# Patient Record
Sex: FEMALE | Race: WHITE | NOT HISPANIC OR LATINO | Employment: FULL TIME | URBAN - METROPOLITAN AREA
[De-identification: names, ages, dates, MRNs, and addresses within clinical notes are randomized per-mention and may not be internally consistent; named-entity substitution may affect disease eponyms.]

---

## 2017-01-12 ENCOUNTER — OFFICE VISIT (OUTPATIENT)
Dept: GASTROENTEROLOGY | Facility: CLINIC | Age: 27
End: 2017-01-12

## 2017-01-12 ENCOUNTER — APPOINTMENT (OUTPATIENT)
Dept: LAB | Facility: HOSPITAL | Age: 27
End: 2017-01-12
Attending: INTERNAL MEDICINE

## 2017-01-12 VITALS
DIASTOLIC BLOOD PRESSURE: 66 MMHG | SYSTOLIC BLOOD PRESSURE: 118 MMHG | BODY MASS INDEX: 24.73 KG/M2 | WEIGHT: 139.6 LBS | HEIGHT: 63 IN

## 2017-01-12 DIAGNOSIS — R12 HEARTBURN: ICD-10-CM

## 2017-01-12 DIAGNOSIS — R10.84 GENERALIZED ABDOMINAL PAIN: ICD-10-CM

## 2017-01-12 DIAGNOSIS — R19.7 DIARRHEA, UNSPECIFIED TYPE: ICD-10-CM

## 2017-01-12 DIAGNOSIS — K59.00 CONSTIPATION, UNSPECIFIED CONSTIPATION TYPE: Primary | ICD-10-CM

## 2017-01-12 PROCEDURE — 83516 IMMUNOASSAY NONANTIBODY: CPT | Performed by: INTERNAL MEDICINE

## 2017-01-12 PROCEDURE — 99203 OFFICE O/P NEW LOW 30 MIN: CPT | Performed by: INTERNAL MEDICINE

## 2017-01-12 PROCEDURE — 36415 COLL VENOUS BLD VENIPUNCTURE: CPT | Performed by: INTERNAL MEDICINE

## 2017-01-12 RX ORDER — OMEPRAZOLE 20 MG/1
20 CAPSULE, DELAYED RELEASE ORAL DAILY
Qty: 30 CAPSULE | Refills: 5 | Status: SHIPPED | OUTPATIENT
Start: 2017-01-12 | End: 2017-07-01 | Stop reason: SDUPTHER

## 2017-01-12 NOTE — PROGRESS NOTES
PATIENT INFORMATION  Bernardo Villafana       - 1990    CHIEF COMPLAINT  Chief Complaint   Patient presents with   • Abdominal Pain   • Constipation   • Diarrhea   • Heartburn       HISTORY OF PRESENT ILLNESS  Abdominal Pain   Associated symptoms include constipation, diarrhea and headaches. Her past medical history is significant for GERD.   Constipation   Associated symptoms include abdominal pain and diarrhea.   Diarrhea    Associated symptoms include abdominal pain and headaches.   Heartburn   She complains of abdominal pain.     25 yo here with long history of intermittent crampy abdominal pain with associated bloating and gas ongoing for years.  She was told she had IBS and to increase fiber intake. She was doing better up until about 2 months ago when she developed intermittent retrosternal chest pains,occured daily. Pains usually does not occur with eating. They last sometimes hours. TUMS did not help. No radiation of pain. Intensity was mild. Worse with stress. No dysphagia or odynophagia. She is under a lot of stress in the past several months with selling a house.  She tried Prilosec otc for 2 weeks and did not have any chest pains at this time.       REVIEW OF SYSTEMS  Review of Systems   HENT: Positive for congestion, dental problem, rhinorrhea and sinus pressure.    Gastrointestinal: Positive for abdominal pain, constipation and diarrhea.        REFLUX   Allergic/Immunologic: Positive for environmental allergies.   Neurological: Positive for headaches.   Psychiatric/Behavioral: Positive for sleep disturbance. The patient is nervous/anxious.         ANXIETY/DEPRESSION   All other systems reviewed and are negative.        ACTIVE PROBLEMS  There are no active problems to display for this patient.        PAST MEDICAL HISTORY  Past Medical History   Diagnosis Date   • GERD (gastroesophageal reflux disease)          SURGICAL HISTORY  Past Surgical History   Procedure Laterality Date   • [place]  "tooth extraction           FAMILY HISTORY  Family History   Problem Relation Age of Onset   • No Known Problems Mother    • No Known Problems Father          SOCIAL HISTORY  Social History     Occupational History   • Not on file.     Social History Main Topics   • Smoking status: Never Smoker   • Smokeless tobacco: Never Used   • Alcohol use Yes   • Drug use: Not on file   • Sexual activity: Not on file         CURRENT MEDICATIONS    Current Outpatient Prescriptions:   •  ALPRAZolam (XANAX) 0.5 MG tablet, Take 0.5 mg by mouth 2 (Two) Times a Day As Needed for anxiety., Disp: , Rfl:   •  FLUoxetine (PROzac) 20 MG capsule, Take 20 mg by mouth Daily., Disp: , Rfl:   •  omeprazole (priLOSEC) 20 MG capsule, Take 1 capsule by mouth Daily., Disp: 30 capsule, Rfl: 5    ALLERGIES  Sulfa antibiotics    VITALS  Vitals:    01/12/17 1110   BP: 118/66   Weight: 139 lb 9.6 oz (63.3 kg)   Height: 63\" (160 cm)       LAST RESULTS   No results found for any previous visit.  No results found.    PHYSICAL EXAM  Physical Exam   Constitutional: She is oriented to person, place, and time. She appears well-developed and well-nourished. No distress.   HENT:   Head: Normocephalic and atraumatic.   Mouth/Throat: Oropharynx is clear and moist.   Eyes: EOM are normal. Pupils are equal, round, and reactive to light.   Neck: Normal range of motion. No tracheal deviation present.   Cardiovascular: Normal rate, regular rhythm, normal heart sounds and intact distal pulses.  Exam reveals no gallop and no friction rub.    No murmur heard.  Pulmonary/Chest: Effort normal and breath sounds normal. No stridor. No respiratory distress. She has no wheezes. She has no rales. She exhibits no tenderness.   Abdominal: Soft. Bowel sounds are normal. She exhibits no distension. There is no tenderness. There is no rebound and no guarding.   Musculoskeletal: She exhibits no edema.   Lymphadenopathy:     She has no cervical adenopathy.   Neurological: She is alert " and oriented to person, place, and time.   Skin: Skin is warm. She is not diaphoretic.   Psychiatric: She has a normal mood and affect. Her behavior is normal. Judgment and thought content normal.   Nursing note and vitals reviewed.      ASSESSMENT  Diagnoses and all orders for this visit:    Constipation, unspecified constipation type    Diarrhea, unspecified type    Heartburn  -     Celiac Ab tTG DGP TIgA    Generalized abdominal pain  -     Celiac Ab tTG DGP TIgA    Other orders  -     omeprazole (priLOSEC) 20 MG capsule; Take 1 capsule by mouth Daily.          PLAN  No Follow-up on file.    Align once daily    Antireflux measures and dietary modifications reviewed. Low acid diet reviewed. Keep head of bed elevated. Stop eating/drinking at least 3 hours prior to bedtime. Eliminate caffeine and carbonated beverages.  Weight loss encouraged if BMI over 25.

## 2017-01-12 NOTE — LETTER
January 12, 2017     Raisa Buchanan MD  [address]  [facility] [address]    Patient: Bernardo Villafana   YOB: 1990   Date of Visit: 1/12/2017       Dear Dr. Dewayne MD:    Thank you for referring Bernardo Villafana to me for evaluation. Below are the relevant portions of my assessment and plan of care.                   If you have questions, please do not hesitate to call me. I look forward to following Bernardo along with you.         Sincerely,        Elizabet Martines MD        CC: No Recipients

## 2017-01-12 NOTE — MR AVS SNAPSHOT
Bernardo Villafana   1/12/2017 11:15 AM   Office Visit    Dept Phone:  [phone]   Encounter #:  [id number]    Provider:  Elizabet Martines MD   Department:  [facility]                Your Full Care Plan              Where to Get Your Medications      These medications were sent to [facility]/pharmacy #[id number] - [place], [place] - [address] [address] AT INTERSECTION OF [address] [phone] [facility] [phone]   [address] [address], [facility] [address]     Phone:  [phone]     omeprazole 20 MG capsule            Your Updated Medication List          This list is accurate as of: 1/12/17 12:01 PM.  Always use your most recent med list.                ALPRAZolam 0.5 MG tablet   Commonly known as:  XANAX       FLUoxetine 20 MG capsule   Commonly known as:  PROzac       omeprazole 20 MG capsule   Commonly known as:  priLOSEC   Take 1 capsule by mouth Daily.               We Performed the Following     Celiac Ab tTG DGP TIgA       You Were Diagnosed With        Codes Comments    Constipation, unspecified constipation type    -  Primary ICD-10-CM: K59.00  ICD-9-CM: 564.00     Diarrhea, unspecified type     ICD-10-CM: R19.7  ICD-9-CM: 787.91     Heartburn     ICD-10-CM: R12  ICD-9-CM: 787.1     Generalized abdominal pain     ICD-10-CM: R10.84  ICD-9-CM: 789.07       Instructions     None    Patient Instructions History      Upcoming Appointments     Visit Type Date Time Department    NEW PATIENT 1/12/2017 11:15 AM [facility]    OFFICE VISIT 3/22/2017  3:30 PM [facility]      MyChart Signup     Our records indicate that you have declined [facility] MyChart signup. If you would like to sign up for MyChart, please email [email or web address] or call [phone] to obtain an activation code.             Other Info from Your Visit           Your Appointments     Mar 22, 2017  3:30 PM EDT   Office Visit with Elizabet Martines MD   [facility]  "[facility] (--)    [address]  [place] [address] [phone]           Arrive 15 minutes prior to appointment.              Allergies     Sulfa Antibiotics  Nausea And Vomiting      Reason for Visit     Abdominal Pain     Constipation     Diarrhea     Heartburn           Vital Signs     Blood Pressure Height Weight Body Mass Index Smoking Status       118/66 63\" (160 cm) 139 lb 9.6 oz (63.3 kg) 24.73 kg/m2 Never Smoker       Problems and Diagnoses Noted     Constipation    -  Primary    Diarrhea        Heartburn        Generalized abdominal pain            "

## 2017-01-13 LAB
GLIADIN PEPTIDE IGA SER-ACNC: 4 UNITS (ref 0–19)
GLIADIN PEPTIDE IGG SER-ACNC: 16 UNITS (ref 0–19)
IGA SERPL-MCNC: 213 MG/DL (ref 87–352)
TTG IGA SER-ACNC: <2 U/ML (ref 0–3)
TTG IGG SER-ACNC: <2 U/ML (ref 0–5)

## 2017-03-22 ENCOUNTER — OFFICE VISIT (OUTPATIENT)
Dept: GASTROENTEROLOGY | Facility: CLINIC | Age: 27
End: 2017-03-22

## 2017-03-22 VITALS
BODY MASS INDEX: 24.77 KG/M2 | SYSTOLIC BLOOD PRESSURE: 120 MMHG | DIASTOLIC BLOOD PRESSURE: 67 MMHG | WEIGHT: 139.8 LBS | HEIGHT: 63 IN

## 2017-03-22 DIAGNOSIS — R10.30 LOWER ABDOMINAL PAIN: Primary | ICD-10-CM

## 2017-03-22 DIAGNOSIS — K21.9 GASTROESOPHAGEAL REFLUX DISEASE, ESOPHAGITIS PRESENCE NOT SPECIFIED: ICD-10-CM

## 2017-03-22 PROCEDURE — 99214 OFFICE O/P EST MOD 30 MIN: CPT | Performed by: INTERNAL MEDICINE

## 2017-03-22 RX ORDER — HYOSCYAMINE SULFATE 0.125 MG
0.12 TABLET ORAL
Qty: 120 TABLET | Refills: 5 | Status: SHIPPED | OUTPATIENT
Start: 2017-03-22 | End: 2022-01-17

## 2017-03-22 NOTE — PROGRESS NOTES
PATIENT INFORMATION  Bernardo Villafana       - 1990    CHIEF COMPLAINT  Chief Complaint   Patient presents with   • Abdominal Pain     10 WEEK FOLLOW UP       HISTORY OF PRESENT ILLNESS  Abdominal Pain   Associated symptoms include headaches.     She is here today in follow up and chest pains have been better since continuing on Prilosec. No dysphagia or odynophagia. Labs are reviewed and celiac was negative.  Her chronic abdominal pain has been ongoing for years, intermittent, lower abdomen.  Symptoms are worse with dairy. She has had some modifications in her diet without any help. Pain occurs every other day. No known alleviating factors. Weight has been stable and no blood in the stool. No family history of IBD or colon cancer.  Pain severity is mild.   No carbonated beverages. Occ coffee in the am. She does drink fruit juices daily.      REVIEW OF SYSTEMS  Review of Systems   HENT: Positive for sinus pressure.    Gastrointestinal: Positive for abdominal pain.   Neurological: Positive for headaches.   All other systems reviewed and are negative.        ACTIVE PROBLEMS  There are no active problems to display for this patient.        PAST MEDICAL HISTORY  Past Medical History:   Diagnosis Date   • GERD (gastroesophageal reflux disease)          SURGICAL HISTORY  Past Surgical History:   Procedure Laterality Date   • WISDOM TOOTH EXTRACTION           FAMILY HISTORY  Family History   Problem Relation Age of Onset   • No Known Problems Mother    • No Known Problems Father          SOCIAL HISTORY  Social History     Occupational History   • Not on file.     Social History Main Topics   • Smoking status: Never Smoker   • Smokeless tobacco: Never Used   • Alcohol use Yes   • Drug use: Not on file   • Sexual activity: Not on file         CURRENT MEDICATIONS    Current Outpatient Prescriptions:   •  ALPRAZolam (XANAX) 0.5 MG tablet, Take 0.5 mg by mouth 2 (Two) Times a Day As Needed for anxiety., Disp: , Rfl:  "  •  FLUoxetine (PROzac) 20 MG capsule, Take 20 mg by mouth Daily., Disp: , Rfl:   •  hyoscyamine (ANASPAZ,LEVSIN) 0.125 MG tablet, Take 1 tablet by mouth 2 (Two) Times a Day Before Meals., Disp: 120 tablet, Rfl: 5  •  omeprazole (priLOSEC) 20 MG capsule, Take 1 capsule by mouth Daily., Disp: 30 capsule, Rfl: 5    ALLERGIES  Sulfa antibiotics    VITALS  Vitals:    03/22/17 1516   BP: 120/67   Weight: 139 lb 12.8 oz (63.4 kg)   Height: 63\" (160 cm)       LAST RESULTS   Office Visit on 01/12/2017   Component Date Value Ref Range Status   • IgA 01/12/2017 213  87 - 352 mg/dL Final   • Gliadin Deamidated Peptide Ab, IgA 01/12/2017 4  0 - 19 units Final                       Negative                   0 - 19                     Weak Positive             20 - 30                     Moderate to Strong Positive   >30   • Deaminated Gliadin Ab IgG 01/12/2017 16  0 - 19 units Final                       Negative                   0 - 19                     Weak Positive             20 - 30                     Moderate to Strong Positive   >30   • Tissue Transglutaminase IgA 01/12/2017 <2  0 - 3 U/mL Final                                  Negative        0 -  3                                Weak Positive   4 - 10                                Positive           >10   Tissue Transglutaminase (tTG) has been identified   as the endomysial antigen.  Studies have demonstr-   ated that endomysial IgA antibodies have over 99%   specificity for gluten sensitive enteropathy.   • Tissue Transglutaminase IgG 01/12/2017 <2  0 - 5 U/mL Final                                  Negative        0 - 5                                Weak Positive   6 - 9                                Positive           >9     No results found.    PHYSICAL EXAM  Physical Exam   Constitutional: She is oriented to person, place, and time. She appears well-developed and well-nourished. No distress.   HENT:   Head: Normocephalic and atraumatic.   Mouth/Throat: " Oropharynx is clear and moist.   Eyes: EOM are normal. Pupils are equal, round, and reactive to light.   Neck: Normal range of motion. No tracheal deviation present.   Cardiovascular: Normal rate, regular rhythm, normal heart sounds and intact distal pulses.  Exam reveals no gallop and no friction rub.    No murmur heard.  Pulmonary/Chest: Effort normal and breath sounds normal. No stridor. No respiratory distress. She has no wheezes. She has no rales. She exhibits no tenderness.   Abdominal: Soft. Bowel sounds are normal. She exhibits no distension. There is no tenderness. There is no rebound and no guarding.   Musculoskeletal: She exhibits no edema.   Lymphadenopathy:     She has no cervical adenopathy.   Neurological: She is alert and oriented to person, place, and time.   Skin: Skin is warm. She is not diaphoretic.   Psychiatric: She has a normal mood and affect. Her behavior is normal. Judgment and thought content normal.   Nursing note and vitals reviewed.      ASSESSMENT  Diagnoses and all orders for this visit:    Lower abdominal pain    Gastroesophageal reflux disease, esophagitis presence not specified    Other orders  -     hyoscyamine (ANASPAZ,LEVSIN) 0.125 MG tablet; Take 1 tablet by mouth 2 (Two) Times a Day Before Meals.          PLAN  No Follow-up on file.    Diet diary    Antireflux measures and dietary modifications reviewed. Low acid diet reviewed. Keep head of bed elevated. Stop eating/drinking at least 3 hours prior to bedtime. Eliminate caffeine and carbonated beverages.  Weight loss encouraged if BMI over 25.

## 2017-05-09 ENCOUNTER — OFFICE VISIT (OUTPATIENT)
Dept: GASTROENTEROLOGY | Facility: CLINIC | Age: 27
End: 2017-05-09

## 2017-05-09 VITALS
SYSTOLIC BLOOD PRESSURE: 118 MMHG | HEIGHT: 63 IN | OXYGEN SATURATION: 97 % | BODY MASS INDEX: 24.27 KG/M2 | HEART RATE: 81 BPM | WEIGHT: 137 LBS | TEMPERATURE: 98.3 F | DIASTOLIC BLOOD PRESSURE: 68 MMHG

## 2017-05-09 DIAGNOSIS — R12 HEARTBURN: ICD-10-CM

## 2017-05-09 DIAGNOSIS — K58.9 IRRITABLE BOWEL SYNDROME, UNSPECIFIED TYPE: ICD-10-CM

## 2017-05-09 DIAGNOSIS — R10.30 LOWER ABDOMINAL PAIN: Primary | ICD-10-CM

## 2017-05-09 DIAGNOSIS — R19.7 DIARRHEA, UNSPECIFIED TYPE: ICD-10-CM

## 2017-05-09 DIAGNOSIS — K59.00 CONSTIPATION, UNSPECIFIED CONSTIPATION TYPE: ICD-10-CM

## 2017-05-09 PROCEDURE — 99213 OFFICE O/P EST LOW 20 MIN: CPT | Performed by: INTERNAL MEDICINE

## 2017-07-03 RX ORDER — OMEPRAZOLE 20 MG/1
CAPSULE, DELAYED RELEASE ORAL
Qty: 30 CAPSULE | Refills: 5 | Status: SHIPPED | OUTPATIENT
Start: 2017-07-03 | End: 2022-01-17

## 2021-10-12 ENCOUNTER — TRANSCRIBE ORDERS (OUTPATIENT)
Dept: ULTRASOUND IMAGING | Facility: HOSPITAL | Age: 31
End: 2021-10-12

## 2021-10-12 DIAGNOSIS — N93.9 VAGINAL BLEEDING: Primary | ICD-10-CM

## 2021-10-13 ENCOUNTER — HOSPITAL ENCOUNTER (OUTPATIENT)
Dept: ULTRASOUND IMAGING | Facility: HOSPITAL | Age: 31
Discharge: HOME | End: 2021-10-13
Admitting: INTERNAL MEDICINE

## 2021-10-13 DIAGNOSIS — N93.9 VAGINAL BLEEDING: ICD-10-CM

## 2021-10-13 PROCEDURE — 76817 TRANSVAGINAL US OBSTETRIC: CPT

## 2021-10-15 ENCOUNTER — HOSPITAL ENCOUNTER (OUTPATIENT)
Dept: ULTRASOUND IMAGING | Facility: HOSPITAL | Age: 31
End: 2021-10-15

## 2021-11-10 ENCOUNTER — OFFICE VISIT (OUTPATIENT)
Dept: OBSTETRICS AND GYNECOLOGY | Facility: CLINIC | Age: 31
End: 2021-11-10

## 2021-11-10 VITALS
HEIGHT: 63 IN | OXYGEN SATURATION: 100 % | BODY MASS INDEX: 28.49 KG/M2 | SYSTOLIC BLOOD PRESSURE: 122 MMHG | WEIGHT: 160.8 LBS | HEART RATE: 105 BPM | DIASTOLIC BLOOD PRESSURE: 78 MMHG

## 2021-11-10 DIAGNOSIS — Z01.419 WELL WOMAN EXAM: ICD-10-CM

## 2021-11-10 DIAGNOSIS — Z87.59 H/O ONE MISCARRIAGE: ICD-10-CM

## 2021-11-10 DIAGNOSIS — Z11.51 SPECIAL SCREENING EXAMINATION FOR HUMAN PAPILLOMAVIRUS (HPV): ICD-10-CM

## 2021-11-10 DIAGNOSIS — Z01.419 ROUTINE GYNECOLOGICAL EXAMINATION: ICD-10-CM

## 2021-11-10 DIAGNOSIS — Z01.419 PAP SMEAR, LOW-RISK: Primary | ICD-10-CM

## 2021-11-10 LAB
B-HCG UR QL: NEGATIVE
BILIRUB BLD-MCNC: NEGATIVE MG/DL
CLARITY, POC: CLEAR
COLOR UR: YELLOW
EXPIRATION DATE: NORMAL
GLUCOSE UR STRIP-MCNC: NEGATIVE MG/DL
INTERNAL NEGATIVE CONTROL: NEGATIVE
INTERNAL POSITIVE CONTROL: POSITIVE
KETONES UR QL: NEGATIVE
LEUKOCYTE EST, POC: NEGATIVE
Lab: NORMAL
NITRITE UR-MCNC: NEGATIVE MG/ML
PH UR: 5 [PH] (ref 5–8)
PROT UR STRIP-MCNC: NEGATIVE MG/DL
RBC # UR STRIP: NEGATIVE /UL
SP GR UR: 1.02 (ref 1–1.03)
UROBILINOGEN UR QL: NORMAL

## 2021-11-10 PROCEDURE — 81002 URINALYSIS NONAUTO W/O SCOPE: CPT | Performed by: OBSTETRICS & GYNECOLOGY

## 2021-11-10 PROCEDURE — 99385 PREV VISIT NEW AGE 18-39: CPT | Performed by: OBSTETRICS & GYNECOLOGY

## 2021-11-10 PROCEDURE — 81025 URINE PREGNANCY TEST: CPT | Performed by: OBSTETRICS & GYNECOLOGY

## 2021-11-10 RX ORDER — DEXTROAMPHETAMINE SACCHARATE, AMPHETAMINE ASPARTATE, DEXTROAMPHETAMINE SULFATE AND AMPHETAMINE SULFATE 5; 5; 5; 5 MG/1; MG/1; MG/1; MG/1
20 TABLET ORAL DAILY
COMMUNITY
End: 2022-01-17

## 2021-11-10 RX ORDER — MULTIPLE VITAMINS W/ MINERALS TAB 9MG-400MCG
1 TAB ORAL DAILY
COMMUNITY
End: 2022-05-27

## 2021-11-10 RX ORDER — CETIRIZINE HYDROCHLORIDE 10 MG/1
10 TABLET ORAL DAILY
COMMUNITY

## 2021-11-10 NOTE — PROGRESS NOTES
OB CONFIRMATION APPOINTMENT    CC- Pt has had a menstrual irregularity    No chief complaint on file.       HISTORY OF PRESENT ILLNESS:    Amenorrhea  This is a new problem. The current episode started more than 1 month ago. The problem occurs constantly. The problem has been unchanged. Associated symptoms include fatigue. Pertinent negatives include no abdominal pain. Nothing aggravates the symptoms. She has tried nothing for the symptoms.       Bernardo Tapia is being seen today to confirm she is pregnant.    She is a 30 y.o. No obstetric history on file. No LMP recorded..  EDC= ***.  Gestational age is ***wks     Current obstetric complaints : ***     Prior obstetric issues, potential pregnancy concerns: ***    Family history of genetic issues (includes FOB): ***    OFFERED GENETIC TESTING: CfDNA, Cystic fibrosis, Spinal muscular atrophy, Fragile X syndrome: ***    Prior infections concerning in pregnancy (Rash, fever in last 2 weeks): ***    HISTORY REVIEWED:      Past Medical History:   Diagnosis Date   • GERD (gastroesophageal reflux disease)        Past Surgical History:   Procedure Laterality Date   • WISDOM TOOTH EXTRACTION           Current Outpatient Medications:   •  ALPRAZolam (XANAX) 0.5 MG tablet, Take 0.5 mg by mouth 2 (Two) Times a Day As Needed for anxiety., Disp: , Rfl:   •  CRYSELLE-28 0.3-30 MG-MCG per tablet, TAKE 1 TABLET BY MOUTH EVERY DAY, Disp: , Rfl: 12  •  DULoxetine (CYMBALTA) 30 MG capsule, , Disp: , Rfl:   •  FLUoxetine (PROzac) 20 MG capsule, Take 20 mg by mouth Daily., Disp: , Rfl:   •  hyoscyamine (ANASPAZ,LEVSIN) 0.125 MG tablet, Take 1 tablet by mouth 2 (Two) Times a Day Before Meals., Disp: 120 tablet, Rfl: 5  •  omeprazole (priLOSEC) 20 MG capsule, TAKE 1 CAPSULE BY MOUTH DAILY., Disp: 30 capsule, Rfl: 5  •  VYVANSE 20 MG capsule, TAKE 1 CAPSULE EVERY MORNING, Disp: , Rfl: 0    Allergies   Allergen Reactions   • Sulfa Antibiotics Nausea And Vomiting       Social History      Socioeconomic History   • Marital status:    Tobacco Use   • Smoking status: Never Smoker   • Smokeless tobacco: Never Used   Substance and Sexual Activity   • Alcohol use: Yes       Family History   Problem Relation Age of Onset   • No Known Problems Mother    • No Known Problems Father        REVIEW OF SYSTEMS:     Review of Systems   Constitutional: Positive for fatigue.   HENT: Negative.    Eyes: Negative.    Respiratory: Negative.    Cardiovascular: Negative.    Gastrointestinal: Negative.  Negative for abdominal pain.   Endocrine: Negative.    Genitourinary: Positive for menstrual problem.   Musculoskeletal: Negative.    Skin: Negative.    Allergic/Immunologic: Negative.    Neurological: Negative.    Hematological: Negative.    Psychiatric/Behavioral: Negative.        Physical Exam     Physical Exam  Vitals and nursing note reviewed.   Constitutional:       Appearance: She is well-developed.   HENT:      Head: Normocephalic and atraumatic.   Cardiovascular:      Rate and Rhythm: Normal rate.   Pulmonary:      Effort: Pulmonary effort is normal.   Abdominal:      General: There is no distension.      Palpations: Abdomen is soft. There is no mass.      Tenderness: There is no abdominal tenderness. There is no guarding.   Genitourinary:     Vagina: No vaginal discharge.   Musculoskeletal:         General: No tenderness or deformity. Normal range of motion.      Cervical back: Normal range of motion.   Skin:     General: Skin is warm and dry.      Coloration: Skin is not pale.      Findings: No erythema or rash.   Neurological:      Mental Status: She is alert and oriented to person, place, and time.   Psychiatric:         Behavior: Behavior normal.         Thought Content: Thought content normal.         Judgment: Judgment normal.             Objective    There were no vitals taken for this visit.    Bernardo Tapia  reports that she has never smoked. She has never used smokeless tobacco.. I have  "educated her on the risk of diseases from using tobacco products such as {Tobacco Cessation Diseases:42204::\"cancer\",\"COPD\",\"heart disease\"}.     I advised her to quit and she is {Willing/Not Willing to Quit Tobacco Products:42659}.    I spent {Time Spent Tobacco :89967} minutes counseling the patient.               Assessment    1) Pregnancy at Unknown   There are no diagnoses linked to this encounter.     Plan    Patient is on Prenatal vitamins  Problem list reviewed and updated.  Reviewed routine prenatal care with the patient  Zika (travel restrictions/ok to use insect repellant), not to changing cat litter, food restrictions, avoidance of alcohol, tobacco and drugs and saunas/hot tubs.   All questions answered.     Counseling was given to {person:[id number]} for the following topics: {topic:63255} . I spent *** minutes caring for Bernardo on this date of service. This time includes time spent by me in the following activities: {TIMEACTIVITIES:58386}      RTO No follow-ups on file.    Alexis Martinez MD    11/9/2021  20:32 EST        "

## 2021-11-10 NOTE — PROGRESS NOTES
GYN Annual Exam     CC- Here for annual exam. Pt has had a miscarriage recently diagnosed by her PCP.     Pt new to practice? Yes  Pt new to me? Yes     Bernardo Tapia is a 30 y.o. No obstetric history on file. female who presents for annual well woman exam. Patient's last menstrual period was 10/08/2021.    Problems in addition to need for annual: wants to make sure everything is ok.     HPI: History of Present Illness    PMHX:  Patient Active Problem List   Diagnosis   • H/O one miscarriage   ; otherwise none    OB History    No obstetric history on file.           Past Medical History:   Diagnosis Date   • GERD (gastroesophageal reflux disease)        Past Surgical History:   Procedure Laterality Date   • WISDOM TOOTH EXTRACTION           Current Outpatient Medications:   •  ALPRAZolam (XANAX) 0.5 MG tablet, Take 0.5 mg by mouth 2 (Two) Times a Day As Needed for anxiety., Disp: , Rfl:   •  amphetamine-dextroamphetamine (ADDERALL) 20 MG tablet, Take 20 mg by mouth Daily., Disp: , Rfl:   •  cetirizine (zyrTEC) 10 MG tablet, Take 10 mg by mouth Daily., Disp: , Rfl:   •  multivitamin with minerals tablet tablet, Take 1 tablet by mouth Daily., Disp: , Rfl:   •  CRYSELLE-28 0.3-30 MG-MCG per tablet, TAKE 1 TABLET BY MOUTH EVERY DAY, Disp: , Rfl: 12  •  DULoxetine (CYMBALTA) 30 MG capsule, , Disp: , Rfl:   •  FLUoxetine (PROzac) 20 MG capsule, Take 20 mg by mouth Daily., Disp: , Rfl:   •  hyoscyamine (ANASPAZ,LEVSIN) 0.125 MG tablet, Take 1 tablet by mouth 2 (Two) Times a Day Before Meals., Disp: 120 tablet, Rfl: 5  •  omeprazole (priLOSEC) 20 MG capsule, TAKE 1 CAPSULE BY MOUTH DAILY., Disp: 30 capsule, Rfl: 5  •  VYVANSE 20 MG capsule, TAKE 1 CAPSULE EVERY MORNING, Disp: , Rfl: 0    Allergies   Allergen Reactions   • Sulfa Antibiotics Nausea And Vomiting and Hives       Social History     Tobacco Use   • Smoking status: Never Smoker   • Smokeless tobacco: Never Used   Substance Use Topics   • Alcohol use: Yes   •  "Drug use: Not on file       Bernardo Tapia  reports that she has never smoked. She has never used smokeless tobacco..            Family History   Problem Relation Age of Onset   • No Known Problems Mother    • No Known Problems Father        Review of Systems    Patient reports that she is not currently experiencing any symptoms of urinary incontinence.      noTESTED FOR CHLAMYDIA?    EXAM:  /78 (BP Location: Left arm, Patient Position: Sitting, Cuff Size: Adult)   Pulse 105   Ht 160 cm (63\")   Wt 72.9 kg (160 lb 12.8 oz)   LMP 10/08/2021   SpO2 100%   BMI 28.48 kg/m²     Labs:   Lab Results (last 24 hours)     Procedure Component Value Units Date/Time    POC Pregnancy, Urine [[id number]]  (Normal) Collected: 11/10/21 0919    Specimen: Urine Updated: 11/10/21 0920     HCG, Urine, QL Negative     Lot Number [id number]     Internal Positive Control Positive     Internal Negative Control Negative     Expiration Date 01/31/23    POC Urinalysis Dipstick [[id number]]  (Normal) Collected: 11/10/21 0919    Specimen: Urine Updated: 11/10/21 0920     Color Yellow     Clarity, UA Clear     Glucose, UA Negative mg/dL      Bilirubin Negative     Ketones, UA Negative     Specific Gravity  1.025     Blood, UA Negative     pH, Urine 5.0     Protein, POC Negative mg/dL      Urobilinogen, UA Normal     Leukocytes Negative     Nitrite, UA Negative          Physical Exam  Vitals and nursing note reviewed. Exam conducted with a chaperone present.   Constitutional:       General: She is not in acute distress.     Appearance: She is well-developed. She is not diaphoretic.   HENT:      Head: Normocephalic and atraumatic.      Nose: Nose normal.   Eyes:      Extraocular Movements: Extraocular movements intact.   Cardiovascular:      Rate and Rhythm: Normal rate.   Pulmonary:      Effort: Pulmonary effort is normal.   Chest:   Breasts: Breasts are symmetrical.      Right: Normal. No mass, nipple discharge, skin change, tenderness " or axillary adenopathy.      Left: Normal. No mass, nipple discharge, skin change, tenderness or axillary adenopathy.       Abdominal:      General: There is no distension.      Palpations: Abdomen is soft. There is no mass.      Tenderness: There is no abdominal tenderness. There is no guarding.   Genitourinary:     General: Normal vulva.      Pubic Area: No rash.       Vagina: Normal. No vaginal discharge.      Cervix: Normal.      Uterus: Normal.       Adnexa: Right adnexa normal and left adnexa normal.   Musculoskeletal:         General: No tenderness or deformity. Normal range of motion.      Cervical back: Normal range of motion.   Lymphadenopathy:      Upper Body:      Right upper body: No axillary adenopathy.      Left upper body: No axillary adenopathy.   Skin:     General: Skin is warm and dry.      Coloration: Skin is not pale.      Findings: No erythema or rash.   Neurological:      Mental Status: She is alert and oriented to person, place, and time.   Psychiatric:         Behavior: Behavior normal.         Thought Content: Thought content normal.         Judgment: Judgment normal.            As part of wellness and prevention, the following topics were discussed with the patient: healthy weight, substance abuse/misuse, mental health, encouraging self breast exam, and other counseling and guidance done:  Nutrition, physical activity, healthy weight, injury prevention, misuse of tobacco, alcohol and drugs, sexual behavior and STDs, contraception, dental health, mental health, immunizations breast cancer screening and exams.    I saw the patient with a face mask, gloves and eye protection  The patient herself was masked.  Social distancing was observed as appropriate. All COVID precautions observed.  Pt encouraged to receive COVID vaccine if she hadn't already done this.     Assessment     1) GYN annual well woman exam.   2) PAP done today? Yes  3) problems addressed: yes        Fhx breast cancer? Yes  mother    Fhx ovarian cancer? No    Fhx colon cancer? No    Inv[employer]ae testing offered? Yes           Plan       Follow up prn or one year.    Diagnoses and all orders for this visit:    1. Pap smear, low-risk (Primary)  -     IgP, Aptima HPV    2. H/O one miscarriage    3. Well woman exam    4. Routine gynecological examination  -     POC Urinalysis Dipstick  -     POC Pregnancy, Urine  -     IgP, Aptima HPV    5. Special screening examination for human papillomavirus (HPV)  -     IgP, Aptima HPV        RTO Return in about 1 year (around 11/10/2022) for Annual physical.          Alexis Martinez MD  [unfilled]  09:25 EST

## 2021-11-15 LAB
CYTOLOGIST CVX/VAG CYTO: NORMAL
CYTOLOGY CVX/VAG DOC CYTO: NORMAL
CYTOLOGY CVX/VAG DOC THIN PREP: NORMAL
DX ICD CODE: NORMAL
HIV 1 & 2 AB SER-IMP: NORMAL
HPV I/H RISK 4 DNA CVX QL PROBE+SIG AMP: NEGATIVE
OTHER STN SPEC: NORMAL
STAT OF ADQ CVX/VAG CYTO-IMP: NORMAL

## 2022-01-17 ENCOUNTER — OFFICE VISIT (OUTPATIENT)
Dept: OBSTETRICS AND GYNECOLOGY | Facility: CLINIC | Age: 32
End: 2022-01-17

## 2022-01-17 VITALS
BODY MASS INDEX: 28.53 KG/M2 | SYSTOLIC BLOOD PRESSURE: 122 MMHG | WEIGHT: 161 LBS | DIASTOLIC BLOOD PRESSURE: 74 MMHG | HEIGHT: 63 IN

## 2022-01-17 DIAGNOSIS — N92.6 MISSED MENSES: Primary | ICD-10-CM

## 2022-01-17 DIAGNOSIS — F41.9 ANXIETY: ICD-10-CM

## 2022-01-17 DIAGNOSIS — Z32.01 POSITIVE URINE PREGNANCY TEST: ICD-10-CM

## 2022-01-17 DIAGNOSIS — Z14.1 CYSTIC FIBROSIS CARRIER: ICD-10-CM

## 2022-01-17 LAB
B-HCG UR QL: POSITIVE
EXPIRATION DATE: ABNORMAL
INTERNAL NEGATIVE CONTROL: NEGATIVE
INTERNAL POSITIVE CONTROL: POSITIVE
Lab: ABNORMAL

## 2022-01-17 PROCEDURE — 99214 OFFICE O/P EST MOD 30 MIN: CPT | Performed by: NURSE PRACTITIONER

## 2022-01-17 PROCEDURE — 81025 URINE PREGNANCY TEST: CPT | Performed by: NURSE PRACTITIONER

## 2022-01-17 RX ORDER — PRENATAL VIT NO.126/IRON/FOLIC 28MG-0.8MG
1 TABLET ORAL DAILY
COMMUNITY

## 2022-01-17 RX ORDER — IPRATROPIUM BROMIDE 42 UG/1
2 SPRAY, METERED NASAL 4 TIMES DAILY
COMMUNITY

## 2022-01-17 NOTE — PATIENT INSTRUCTIONS
Prenatal Care  Prenatal care is health care during pregnancy. It helps you and your unborn baby (fetus) stay as healthy as possible. Prenatal care may be provided by a midwife, a family practice health care provider, or a childbirth and pregnancy specialist (obstetrician).  How does this affect me?  During pregnancy, you will be closely monitored for any new conditions that might develop. To lower your risk of pregnancy complications, you and your health care provider will talk about any underlying conditions you have.  How does this affect my baby?  Early and consistent prenatal care increases the chance that your baby will be healthy during pregnancy. Prenatal care lowers the risk that your baby will be:  · Born early (prematurely).  · Smaller than expected at birth (small for gestational age).  What can I expect at the first prenatal care visit?  Your first prenatal care visit will likely be the longest. You should schedule your first prenatal care visit as soon as you know that you are pregnant. Your first visit is a good time to talk about any questions or concerns you have about pregnancy. At your visit, you and your health care provider will talk about:  · Your medical history, including:  ? Any past pregnancies.  ? Your family's medical history.  ? The baby's father's medical history.  ? Any long-term (chronic) health conditions you have and how you manage them.  ? Any surgeries or procedures you have had.  ? Any current over-the-counter or prescription medicines, herbs, or supplements you are taking.  · Other factors that could pose a risk to your baby, including:  · Your home setting and your stress levels, including:  ? Exposure to abuse or violence.  ? Household financial strain.  ? Mental health conditions you have.  · Your daily health habits, including diet and exercise.  Your health care provider will also:  · Measure your weight, height, and blood pressure.  · Do a physical exam, including a pelvic  and breast exam.  · Perform blood tests and urine tests to check for:  ? Urinary tract infection.  ? Sexually transmitted infections (STIs).  ? Low iron levels in your blood (anemia).  ? Blood type and certain proteins on red blood cells (Rh antibodies).  ? Infections and immunity to viruses, such as hepatitis B and rubella.  ? HIV (human immunodeficiency virus).  · Do an ultrasound to confirm your baby's growth and development and to help predict your estimated due date (NERISSA). This ultrasound is done with a probe that is inserted into the vagina (transvaginal ultrasound).  · Discuss your options for genetic screening.  · Give you information about how to keep yourself and your baby healthy, including:  ? Nutrition and taking vitamins.  ? Physical activity.  ? How to manage pregnancy symptoms such as nausea and vomiting (morning sickness).  ? Infections and substances that may be harmful to your baby and how to avoid them.  ? Food safety.  ? Dental care.  ? Working.  ? Travel.  ? Warning signs to watch for and when to call your health care provider.  How often will I have prenatal care visits?  After your first prenatal care visit, you will have regular visits throughout your pregnancy. The visit schedule is often as follows:  · Up to week 28 of pregnancy: once every 4 weeks.  · 28-36 weeks: once every 2 weeks.  · After 36 weeks: every week until delivery.  Some women may have visits more or less often depending on any underlying health conditions and the health of the baby.  Keep all follow-up and prenatal care visits as told by your health care provider. This is important.  What happens during routine prenatal care visits?  Your health care provider will:  · Measure your weight and blood pressure.  · Check for fetal heart sounds.  · Measure the height of your uterus in your abdomen (fundal height). This may be measured starting around week 20 of pregnancy.  · Check the position of your baby inside your  uterus.  · Ask questions about your diet, sleeping patterns, and whether you can feel the baby move.  · Review warning signs to watch for and signs of labor.  · Ask about any pregnancy symptoms you are having and how you are dealing with them. Symptoms may include:  ? Headaches.  ? Nausea and vomiting.  ? Vaginal discharge.  ? Swelling.  ? Fatigue.  ? Constipation.  ? Any discomfort, including back or pelvic pain.  Make a list of questions to ask your health care provider at your routine visits.  What tests might I have during prenatal care visits?  You may have blood, urine, and imaging tests throughout your pregnancy, such as:  · Urine tests to check for glucose, protein, or signs of infection.  · Glucose tests to check for a form of diabetes that can develop during pregnancy (gestational diabetes mellitus). This is usually done around week 24 of pregnancy.  · An ultrasound to check your baby's growth and development and to check for birth defects. This is usually done around week 20 of pregnancy.  · A test to check for group B strep (GBS) infection. This is usually done around week 36 of pregnancy.  · Genetic testing. This may include blood or imaging tests, such as an ultrasound. Some genetic tests are done during the first trimester and some are done during the second trimester.  What else can I expect during prenatal care visits?  Your health care provider may recommend getting certain vaccines during pregnancy. These may include:  · A yearly flu shot (annual influenza vaccine). This is especially important if you will be pregnant during flu season.  · Tdap (tetanus, diphtheria, pertussis) vaccine. Getting this vaccine during pregnancy can protect your baby from whooping cough (pertussis) after birth. This vaccine may be recommended between weeks 27 and 36 of pregnancy.  Later in your pregnancy, your health care provider may give you information about:  · Childbirth and breastfeeding classes.  · Choosing a  health care provider for your baby.  · Umbilical cord banking.  · Breastfeeding.  · Birth control after your baby is born.  · The hospital labor and delivery unit and how to tour it.  · Registering at the hospital before you go into labor.  Where to find more information  · Office on Women's Health: womenshealth.gov  · American Pregnancy Association: americanpregnancy.org  · March of Dimes: marchofdimes.org  Summary  · Prenatal care helps you and your baby stay as healthy as possible during pregnancy.  · Your first prenatal care visit will most likely be the longest.  · You will have visits and tests throughout your pregnancy to monitor your health and your baby's health.  · Bring a list of questions to your visits to ask your health care provider.  · Make sure to keep all follow-up and prenatal care visits with your health care provider.  This information is not intended to replace advice given to you by your health care provider. Make sure you discuss any questions you have with your health care provider.  Document Released: 12/20/2004 Document Revised: 12/17/2018 Document Reviewed: 12/17/2018  [employer] Interactive Patient Education © 2019 [employer]      Tests and Screening During Pregnancy  Having certain tests and screenings during pregnancy is an important part of your prenatal care. These tests help your health care provider find problems that might affect your pregnancy. Some tests are done for all pregnant women, and some are optional. Most of the tests and screenings do not pose any risks for you or your baby. You may need additional testing if any routine tests indicate a problem.  Tests and screenings done in early pregnancy  Some tests and screenings you can expect to have in early pregnancy include:  · Blood tests, such as:  ? Complete blood count (CBC). This test is done to check your red and white blood cells. It can help identify a risk for anemia, infection, or bleeding.  ? Blood typing. This  test determines your blood type as well as whether you have a certain protein in your red blood cells (Rh factor). If you do not have this protein (Rh negative) and your baby does have it (Rh positive), your body could make antibodies to the Rh factor. This could be dangerous to your baby's health.  ? Tests to check for diseases that can cause birth defects or can be passed to your baby, such as:  § [place] measles (rubella). The test indicates whether you are immune to rubella.  § Hepatitis B and C. All women are tested for hepatitis B. You may also be tested for hepatitis C if you have risk factors for the condition.  § Zika virus infection. You may have a blood or urine test to check for this infection if you or your partner has traveled to an area where the virus occurs.  · Urine testing. A urine sample can be tested for diabetes, protein in your urine, and signs of infection.  · Testing for sexually transmitted infections (STIs), such as HIV, syphilis, and chlamydia.  · Testing for tuberculosis. You may have this skin test if you are at risk for tuberculosis.  · Fetal ultrasound. This is an imaging study of your developing baby. It is done using sound waves and a computer. This test may be done at 11-14 weeks to confirm your pregnancy and help determine your due date.  Tests and screenings done later in pregnancy  Certain tests are done for the first time during later pregnancy. In addition, some of the tests that were done in early pregnancy are repeated at this time. Some common tests you can expect to have later in pregnancy include:  · Rh antibody testing. If you are Rh negative, you will have a blood test at about 28 weeks of pregnancy to see if you are producing Rh antibodies. If you have not started to make antibodies, you will be given an injection to prevent you from making antibodies for the rest of your pregnancy.  · Glucose screening. This tests your blood sugar to find out whether you are developing  the type of diabetes that occurs during pregnancy (gestational diabetes). You may have this screening earlier if you have risk factors for diabetes.  · Screening for group B streptococcus (GBS). GBS is a type of bacteria that may live in your rectum or vagina. You may have GBS without any symptoms. GBS can spread to your baby during birth. This test involves doing a rectal and vaginal swab at 35-37 weeks of pregnancy. If testing is positive for GBS, you may be treated with antibiotic medicine.  · CBC to check for anemia and blood-clotting ability.  · Urine tests to check for protein, which can be a sign of a condition called preeclampsia.  · Fetal ultrasound. This may be repeated at 16-20 weeks to check how your baby is growing and developing.  Screening for birth defects  Some birth defects are caused by abnormal genes passed down through families. Early in your pregnancy, tests can be done to find out if your baby is at risk for a genetic disorder. This testing is optional. The type of testing recommended for you will depend on your family and medical history, your ethnicity, and your age. Testing may include:  · Screening tests. These tests may include an ultrasound, blood tests, or a combination of both. The blood tests are used to check for abnormal genes, and the ultrasound is done to look for early birth defects.  · Carrier screening. This test involves checking the blood or saliva of both parents to see if they carry abnormal genes that could be passed down to a baby.  If genetic screening shows that your baby is at risk for a genetic defect, additional diagnostic testing may be recommended, such as:  · Amniocentesis. This involves testing a sample of fluid from your womb (amniotic fluid).  · Chorionic villus sampling. In this test, a sample of cells from your placenta is checked for abnormal cells.  Unlike other tests done during pregnancy, diagnostic testing does have some risk for your pregnancy. Talk to  your health care provider about the risks and benefits of genetic testing.  Where to find more information  · American Pregnancy Association: americanpregnancy.org/prenatal-testing  · Office on Women's Health: womenshealth.gov/pregnancy  · March of Dimes: marchofdimes.org/pregnancy  Questions to ask your health care provider  · What routine tests are recommended for me?  · When and how will these tests be done?  · When will I get the results of routine tests?  · What do the results of these tests mean for me or my baby?  · Do you recommend any genetic screening tests? Which ones?  · Should I see a genetic counselor before having genetic screening?  Summary  · Having tests and screenings during pregnancy is an important part of your prenatal care.  · In early pregnancy, testing may be done to check blood type, Rh status, and risks for various conditions that can affect your baby.  · Fetal ultrasound may be done in early pregnancy to confirm a pregnancy and later to look for any birth defects.  · Later in pregnancy, tests may include screening for GBS and gestational diabetes.  · Genetic testing is optional. Consider talking to a genetic counselor about this testing.  This information is not intended to replace advice given to you by your health care provider. Make sure you discuss any questions you have with your health care provider.  Document Released: 03/04/2019 Document Revised: 03/04/2019 Document Reviewed: 03/04/2019  [employer] Interactive Patient Education © 2019 [employer]      How a Baby Grows During Pregnancy    Pregnancy begins when a male's sperm enters a female's egg (fertilization). Fertilization usually happens in one of the tubes (fallopian tubes) that connect the ovaries to the womb (uterus). The fertilized egg moves down the fallopian tube to the uterus. Once it reaches the uterus, it implants into the lining of the uterus and begins to grow.  For the first 10 weeks, the fertilized egg is called  an embryo. After 10 weeks, it is called a fetus. As the fetus continues to grow, it receives oxygen and nutrients through tissue (placenta) that grows to support the developing baby. The placenta is the life support system for the baby. It provides oxygen and nutrition and removes waste.  Learning as much as you can about your pregnancy and how your baby is developing can help you enjoy the experience. It can also make you aware of when there might be a problem and when to ask questions.  How long does a typical pregnancy last?  A pregnancy usually lasts 280 days, or about 40 weeks. Pregnancy is divided into three periods of growth, also called trimesters:  · First trimester: 0-12 weeks.  · Second trimester: 13-27 weeks.  · Third trimester: 28-40 weeks.  The day when your baby is ready to be born (full term) is your estimated date of delivery.  How does my baby develop month by month?  First month  · The fertilized egg attaches to the inside of the uterus.  · Some cells will form the placenta. Others will form the fetus.  · The arms, legs, brain, spinal cord, lungs, and heart begin to develop.  · At the end of the first month, the heart begins to beat.  Second month  · The bones, inner ear, eyelids, hands, and feet form.  · The genitals develop.  · By the end of 8 weeks, all major organs are developing.  Third month  · All of the internal organs are forming.  · Teeth develop below the gums.  · Bones and muscles begin to grow. The spine can flex.  · The skin is transparent.  · Fingernails and toenails begin to form.  · Arms and legs continue to grow longer, and hands and feet develop.  · The fetus is about 3 inches (7.6 cm) long.  Fourth month  · The placenta is completely formed.  · The external sex organs, neck, outer ear, eyebrows, eyelids, and fingernails are formed.  · The fetus can hear, swallow, and move its arms and legs.  · The kidneys begin to produce urine.  · The skin is covered with a white, waxy coating  (vernix) and very fine hair (lanugo).  Fifth month  · The fetus moves around more and can be felt for the first time (quickening).  · The fetus starts to sleep and wake up and may begin to suck its finger.  · The nails grow to the end of the fingers.  · The organ in the digestive system that makes bile (gallbladder) functions and helps to digest nutrients.  · If your baby is a girl, eggs are present in her ovaries. If your baby is a boy, testicles start to move down into his scrotum.  Sixth month  · The lungs are formed.  · The eyes open. The brain continues to develop.  · Your baby has fingerprints and toe prints. Your baby's hair grows thicker.  · At the end of the second trimester, the fetus is about 9 inches (22.9 cm) long.  Seventh month  · The fetus kicks and stretches.  · The eyes are developed enough to sense changes in light.  · The hands can make a grasping motion.  · The fetus responds to sound.  Eighth month  · All organs and body systems are fully developed and functioning.  · Bones harden, and taste buds develop. The fetus may hiccup.  · Certain areas of the brain are still developing. The skull remains soft.  Ninth month  · The fetus gains about ½ lb (0.23 kg) each week.  · The lungs are fully developed.  · Patterns of sleep develop.  · The fetus's head typically moves into a head-down position (vertex) in the uterus to prepare for birth.  · The fetus weighs 6-9 lb (2.72-4.08 kg) and is 19-20 inches (48.26-50.8 cm) long.  What can I do to have a healthy pregnancy and help my baby develop?  General instructions  · Take prenatal vitamins as directed by your health care provider. These include vitamins such as folic acid, iron, calcium, and vitamin D. They are important for healthy development.  · Take medicines only as directed by your health care provider. Read labels and ask a pharmacist or your health care provider whether over-the-counter medicines, supplements, and prescription drugs are safe to  take during pregnancy.  · Keep all follow-up visits as directed by your health care provider. This is important. Follow-up visits include prenatal care and screening tests.  How do I know if my baby is developing well?  At each prenatal visit, your health care provider will do several different tests to check on your health and keep track of your baby's development. These include:  · Fundal height and position.  ? Your health care provider will measure your growing belly from your pubic bone to the top of the uterus using a tape measure.  ? Your health care provider will also feel your belly to determine your baby's position.  · Heartbeat.  ? An ultrasound in the first trimester can confirm pregnancy and show a heartbeat, depending on how far along you are.  ? Your health care provider will check your baby's heart rate at every prenatal visit.  · Second trimester ultrasound.  ? This ultrasound checks your baby's development. It also may show your baby's gender.  What should I do if I have concerns about my baby's development?  Always talk with your health care provider about any concerns that you may have about your pregnancy and your baby.  Summary  · A pregnancy usually lasts 280 days, or about 40 weeks. Pregnancy is divided into three periods of growth, also called trimesters.  · Your health care provider will monitor your baby's growth and development throughout your pregnancy.  · Follow your health care provider's recommendations about taking prenatal vitamins and medicines during your pregnancy.  · Talk with your health care provider if you have any concerns about your pregnancy or your developing baby.  This information is not intended to replace advice given to you by your health care provider. Make sure you discuss any questions you have with your health care provider.  Document Released: 06/05/2009 Document Revised: 10/31/2018 Document Reviewed: 10/31/2018  [employer] Interactive Patient Education © 2019  [employer].    Immunizations and Pregnancy  Immunizations, or vaccines, can help to keep you healthy. They can also protect your baby from some diseases until your baby is old enough to safely receive them. If you are pregnant or you are planning a pregnancy, the vaccines that you need are determined by:  · Your age.  · Your lifestyle.  · Your medical history.  · Your travel plans.  · Your previous vaccines.  The benefits of receiving immunizations during pregnancy usually outweigh the risks:  · When the risk of being exposed to a disease is high.  · When infection would pose a risk to you or your unborn baby.  · When the vaccine is not likely to cause harm.  Should I receive immunizations before pregnancy?  If possible, make sure that your vaccines are up to date before you become pregnant. It is safe and important for you to receive weakened viral and weakened bacterial (inactivated) vaccines, as needed, before you are pregnant. Live viral and live bacterial (attenuated) vaccines should be given 1 month or more before pregnancy. Some examples of attenuated vaccines include:  · Live attenuated influenza vaccine (LAIV).  · Measles, mumps, and rubella (MMR).  · Measles, mumps, rubella, and varicella (MMRV).  · Rotavirus (RV5 or RV1).  · Smallpox.  · Typhoid (Ty21a, oral capsule form of the vaccine).  · Varicella (DONNA).  · Shingles.  · Yellow fever (YF).  If you become pregnant within 1 month after you have received an attenuated vaccine, contact your health care provider.  Should I receive immunizations during pregnancy?  It is safe and important for you to receive inactivated vaccines as needed during pregnancy. Until your baby can receive vaccines, your baby will get some protection from diseases through the vaccines that you receive while you are pregnant.  However, some inactivated vaccines have not been thoroughly studied in pregnant women, and at this time, they are not recommended during pregnancy unless the  "benefits outweigh the risks. One example is the pneumococcal polysaccharide vaccine (PPSV23). In addition, the human papillomavirus (HPV4 or HPV2) vaccine is not recommended during pregnancy.  You should receive inactivated influenza (IIV) and adult tetanus, diphtheria, and acellular pertussis (Tdap) vaccines during your pregnancy. The IIV, which is known as \"the flu shot,\" will protect you and your baby (up to 6 months of age) from some complications and strains of influenza. Pregnant women can receive IIV at any time and during any trimester. The Tdap vaccine will help to prevent whooping cough (pertussis) in you and your baby. You should receive 1 dose of this vaccine during each pregnancy. It is recommended that pregnant women receive this vaccine during the 27th-36th weeks of pregnancy.  Usually, attenuated vaccines are not given to pregnant women. There is a possible risk of passing the vaccine virus or bacteria to the unborn baby. If you are pregnant and you received an attenuated vaccine, contact your health care provider.  Should I receive immunizations after pregnancy?  It is safe and important for you to receive vaccines as needed after pregnancy. This is true even if you are breastfeeding. If you did not receive the Tdap vaccine during your pregnancy, you should receive that vaccine right after you give birth to your baby (delivery). If you are not immune to measles, mumps, rubella, or varicella, you should receive the MMR or MMRV vaccine within days after delivery. Most other vaccines are also safe to receive after pregnancy.  What if I am pregnant and I plan to travel internationally?  If you are pregnant and you are planning to travel internationally, talk with your health care provider at least 4-6 weeks before your trip. Discuss precautions or vaccine options. Before you receive vaccines, the risk of disease and immunization should always be determined.  Immunizations that are recommended for " "pregnant international travelers include:  · Hepatitis B (HepB).  · IIV.  · Tetanus and diphtheria (Td) or Tdap.  · Hepatitis A (HepA).  Immunizations that should be delayed or given only when benefits outweigh the risk of disease exposure for pregnant international travelers include:  · [place] encephalitis (JE).  · Meningococcal meningitis (MPSV4 or MCV4).  · PPSV23.  · Inactivated polio (IPV).  · Rabies.  · Typhoid.  · YF.  Immunizations that should not be given to pregnant international travelers include:  · Tuberculosis (BCG).  · MMR.  · MMRV.  · HPV4 or HPV2.  · DONNA.  · LAIV.  This information is not intended to replace advice given to you by your health care provider. Make sure you discuss any questions you have with your health care provider.  Document Released: 01/06/2009 Document Revised: 05/19/2017 Document Reviewed: 01/25/2016  [employer] Interactive Patient Education © 2019 [employer]      Eating Plan for Pregnant Women  While you are pregnant, your body requires additional nutrition to help support your growing baby. You also have a higher need for some vitamins and minerals, such as folic acid, calcium, iron, and vitamin D. Eating a healthy, well-balanced diet is very important for your health and your baby's health. Your need for extra calories varies for the three 3-month segments of your pregnancy (trimesters). For most women, it is recommended to consume:  · 150 extra calories a day during the first trimester.  · 300 extra calories a day during the second trimester.  · 300 extra calories a day during the third trimester.  What are tips for following this plan?    · Do not try to lose weight or go on a diet during pregnancy.  · Limit your overall intake of foods that have \"empty calories.\" These are foods that have little nutritional value, such as sweets, desserts, candies, and sugar-sweetened beverages.  · Eat a variety of foods (especially fruits and vegetables) to get a full range of vitamins " and minerals.  · Take a prenatal vitamin to help meet your additional vitamin and mineral needs during pregnancy, specifically for folic acid, iron, calcium, and vitamin D.  · Remember to stay active. Ask your health care provider what types of exercise and activities are safe for you.  · Practice good food safety and cleanliness. Wash your hands before you eat and after you prepare raw meat. Wash all fruits and vegetables well before peeling or eating. Taking these actions can help to prevent food-borne illnesses that can be very dangerous to your baby, such as listeriosis. Ask your health care provider for more information about listeriosis.  What does 150 extra calories look like?  Healthy options that provide 150 extra calories each day could be any of the following:  · 6-8 oz (170-230 g) of plain low-fat yogurt with ½ cup of berries.  · 1 apple with 2 teaspoons (11 g) of peanut butter.  · Cut-up vegetables with ¼ cup (60 g) of hummus.  · 8 oz (230 mL) or 1 cup of low-fat chocolate milk.  · 1 stick of string cheese with 1 medium orange.  · 1 peanut butter and jelly sandwich that is made with one slice of whole-wheat bread and 1 tsp (5 g) of peanut butter.  For 300 extra calories, you could eat two of those healthy options each day.  What is a healthy amount of weight to gain?  The right amount of weight gain for you is based on your BMI before you became pregnant. If your BMI:  · Was less than 18 (underweight), you should gain 28-40 lb (13-18 kg).  · Was 18-24.9 (normal), you should gain 25-35 lb (11-16 kg).  · Was 25-29.9 (overweight), you should gain 15-25 lb (7-11 kg).  · Was 30 or greater (obese), you should gain 11-20 lb (5-9 kg).  What if I am having twins or multiples?  Generally, if you are carrying twins or multiples:  · You may need to eat 300-600 extra calories a day.  · The recommended range for total weight gain is 25-54 lb (11-25 kg), depending on your BMI before pregnancy.  · Talk with your health  "care provider to find out about nutritional needs, weight gain, and exercise that is right for you.  What foods can I eat?    Grains  All grains. Choose whole grains, such as whole-wheat bread, oatmeal, or brown rice.  Vegetables  All vegetables. Eat a variety of colors and types of vegetables. Remember to wash your vegetables well before peeling or eating.  Fruits  All fruits. Eat a variety of colors and types of fruit. Remember to wash your fruits well before peeling or eating.  Meats and other protein foods  Lean meats, including chicken, turkey, fish, and lean cuts of beef, veal, or pork. If you eat fish or seafood, choose options that are higher in omega-3 fatty acids and lower in mercury, such as salmon, herring, mussels, trout, sardines, pollock, shrimp, crab, and lobster. Tofu. Tempeh. Beans. Eggs. Peanut butter and other nut butters. Make sure that all meats, poultry, and eggs are cooked to food-safe temperatures or \"well-done.\"  Two or more servings of fish are recommended each week in order to get the most benefits from omega-3 fatty acids that are found in seafood. Choose fish that are lower in mercury. You can find more information online:  · www.fda.gov  Dairy  Pasteurized milk and milk alternatives (such as almond milk). Pasteurized yogurt and pasteurized cheese. Cottage cheese. Sour cream.  Beverages  Water. Juices that contain 100% fruit juice or vegetable juice. Caffeine-free teas and decaffeinated coffee.  Drinks that contain caffeine are okay to drink, but it is better to avoid caffeine. Keep your total caffeine intake to less than 200 mg each day (which is 12 oz or 355 mL of coffee, tea, or soda) or the limit as told by your health care provider.  Fats and oils  Fats and oils are okay to include in moderation.  Sweets and desserts  Sweets and desserts are okay to include in moderation.  Seasoning and other foods  All pasteurized condiments.  The items listed above may not be a complete list of " recommended foods and beverages. Contact your dietitian for more options.  What foods are not recommended?  Vegetables  Raw (unpasteurized) vegetable juices.  Fruits  Unpasteurized fruit juices.  Meats and other protein foods  Lunch meats, bologna, hot dogs, or other deli meats. (If you must eat those meats, reheat them until they are steaming hot.) Refrigerated paté, meat spreads from a meat counter, smoked seafood that is found in the refrigerated section of a store. Raw or undercooked meats, poultry, and eggs. Raw fish, such as sushi or sashimi. Fish that have high mercury content, such as tilefish, shark, swordfish, and antionette mackerel.  To learn more about mercury in fish, talk with your health care provider or look for online resources, such as:  · www.fda.gov  Dairy  Raw (unpasteurized) milk and any foods that have raw milk in them. Soft cheeses, such as feta, queso hudson, queso fresco, Brie, Camembert cheeses, blue-veined cheeses, and Panela cheese (unless it is made with pasteurized milk, which must be stated on the label).  Beverages  Alcohol. Sugar-sweetened beverages, such as sodas, teas, or energy drinks.  Seasoning and other foods  Homemade fermented foods and drinks, such as pickles, sauerkraut, or kombucha drinks. (Store-bought pasteurized versions of these are okay.)  Salads that are made in a store or deli, such as ham salad, chicken salad, egg salad, tuna salad, and seafood salad.  The items listed above may not be a complete list of foods and beverages to avoid. Contact your dietitian for more information.  Where to find more information  To calculate the number of calories you need based on your height, weight, and activity level, you can use an online calculator such as:  · www.choosemyplate.gov/MyPlatePlan  To calculate how much weight you should gain during pregnancy, you can use an online pregnancy weight gain calculator such  as:  · www.choosemyplate.gov/pregnancy-weight-gain-calculator  Summary  · While you are pregnant, your body requires additional nutrition to help support your growing baby.  · Eat a variety of foods, especially fruits and vegetables to get a full range of vitamins and minerals.  · Practice good food safety and cleanliness. Wash your hands before you eat and after you prepare raw meat. Wash all fruits and vegetables well before peeling or eating. Taking these actions can help to prevent food-borne illnesses, such as listeriosis, that can be very dangerous to your baby.  · Do not eat raw meat or fish. Do not eat fish that have high mercury content, such as tilefish, shark, swordfish, and antionette mackerel. Do not eat unpasteurized (raw) dairy.  · Take a prenatal vitamin to help meet your additional vitamin and mineral needs during pregnancy, specifically for folic acid, iron, calcium, and vitamin D.  This information is not intended to replace advice given to you by your health care provider. Make sure you discuss any questions you have with your health care provider.  Document Released: 10/02/2015 Document Revised: 09/14/2018 Document Reviewed: 09/14/2018  [employer] Interactive Patient Education © 2019 [employer]    Exercise During Pregnancy  For people of all ages, exercise is an important part of being healthy. Exercise improves heart and lung function and helps to maintain strength, flexibility, and a healthy body weight. Exercise also boosts energy levels and elevates mood.  For most women, maintaining an exercise routine throughout pregnancy is recommended. It is only on rare occasions and with certain medical conditions or pregnancy complications that women may be asked to limit or avoid exercise during pregnancy.  What are some other benefits to exercising during pregnancy?  Along with maintaining strength and flexibility, exercising throughout pregnancy can help to:  · Keep strength in muscles that are very  important during labor and childbirth.  · Decrease low back pain during pregnancy.  · Decrease the risk of developing gestational diabetes mellitus (GDM).  · Improve blood sugar (glucose) control for women who have GDM.  · Decrease the risk of developing preeclampsia. This is a serious condition that causes high blood pressure along with other symptoms, such as swelling and headaches.  · Decrease the risk of  delivery.  · Speed up the recovery after giving birth.  How often should I exercise?  Unless your health care provider gives you different instructions, you should try to exercise on most days or all days of the week. In general, try to exercise with moderate intensity for about 150 minutes per week. This can be spread out across several days, such as exercising for 30 minutes per day on 5 days of each week. You can tell that you are exercising at a moderate intensity if you have a higher heart rate and faster breathing, but you are still able to hold a conversation.  What types of moderate-intensity exercise are recommended during pregnancy?  There are many types of exercise that are safe for you to do during pregnancy. Unless your health care provider gives you different instructions, do a variety of exercises that safely increase your heart and breathing (cardiopulmonary) rates and help you to build and maintain muscle strength (strength training). You should always be able to talk in full sentences while exercising during pregnancy.  Some examples of exercising that is safe to do during pregnancy include:  · Brisk walking or hiking.  · Swimming.  · Water aerobics.  · Riding a stationary bike.  · Strength training.  · Modified yoga or Pilates. Tell your instructor that you are pregnant. Avoid overstretching and avoid lying on your back for long periods of time.  · Running or jogging. Only choose this type of exercise if:  ? You ran or jogged regularly before your pregnancy.  ? You can run or jog and  "still talk in complete sentences.  What types of exercise should I not do during pregnancy?  Depending on your level of fitness and whether you exercised regularly before your pregnancy, you may be advised to limit vigorous-intensity exercise during your pregnancy. You can tell that you are exercising at a vigorous intensity if you are breathing much harder and faster and cannot hold a conversation while exercising.  Some examples of exercising that you should avoid during pregnancy include:  · Contact sports.  · Activities that place you at risk for falling on or being hit in the belly, such as downhill skiing, water skiing, surfing, rock climbing, cycling, gymnastics, and horseback riding.  · Scuba diving.  · Piyush diving.  · Yoga or Pilates in a room that is heated to extreme temperatures (\"hot yoga\" or \"hot Pilates\").  · Jogging or running, unless you ran or jogged regularly before your pregnancy. While jogging or running, you should always be able to talk in full sentences. Do not run or jog so vigorously that you are unable to have a conversation.  · If you are not used to exercising at elevation (more than 6,000 feet above sea level), do not do so during your pregnancy.  When should I avoid exercising during pregnancy?  Certain medical conditions can make it unsafe to exercise during pregnancy, or they may increase your risk of miscarriage or early labor and birth. Some of these conditions include:  · Some types of heart disease.  · Some types of lung disease.  · Placenta previa. This is when the placenta partially or completely covers the opening of the uterus (cervix).  · Frequent bleeding from the vagina during your pregnancy.  · Incompetent cervix. This is when your cervix does not remain as tightly closed during pregnancy as it should.  · Premature labor.  · Ruptured membranes. This is when the protective sac (amniotic sac) opens up and amniotic fluid leaks from your vagina.  · Severely low blood count " (anemia).  · Preeclampsia or pregnancy-caused high blood pressure.  · Carrying more than one baby (multiple gestation) and having an additional risk of early labor.  · Poorly controlled diabetes.  · Being severely underweight or severely overweight.  · Intrauterine growth restriction. This is when your baby's growth and development during pregnancy are slower than expected.  · Other medical conditions. Ask your health care provider if any apply to you.  What else should I know about exercising during pregnancy?  You should take these precautions while exercising during pregnancy:  · Avoid overheating.  ? Wear loose-fitting, breathable clothes.  ? Do not exercise in very high temperatures.  · Avoid dehydration. Drink enough water before, during, and after exercise to keep your urine clear or pale yellow.  · Avoid overstretching. Because of hormone changes during pregnancy, it is easy to overstretch muscles, tendons, and ligaments during pregnancy.  · Start slowly and ask your health care provider to recommend types of exercise that are safe for you, if exercising regularly is new for you.  Pregnancy is not a time for exercising to lose weight.  When should I seek medical care?  You should stop exercising and call your health care provider if you have any unusual symptoms, such as:  · Mild uterine contractions or abdominal cramping.  · Dizziness that does not improve with rest.  When should I seek immediate medical care?  You should stop exercising and call your local emergency services (911 in the [place] if you have any unusual symptoms, such as:  · Sudden, severe pain in your low back or your belly.  · Uterine contractions or abdominal cramping that do not improve with rest.  · Chest pain.  · Bleeding or fluid leaking from your vagina.  · Shortness of breath.  This information is not intended to replace advice given to you by your health care provider. Make sure you discuss any questions you have with your health  care provider.  Document Released: 12/18/2006 Document Revised: 05/17/2017 Document Reviewed: 02/25/2016  [employer] Interactive Patient Education © 2019 [employer]      Dental Work and Pregnancy  Proper dental care before, during, and after pregnancy is important for you and your baby. Pregnancy hormones can sometimes cause the gums to swell, which makes it easier for food to become trapped between teeth. The health of your teeth and gums can affect your growing baby.  Dental care recommendations  To help prevent infection and maintain healthy teeth and gums, a thorough oral examination is recommended for all women during the first trimester of pregnancy. Routine cleanings and examinations are recommended throughout pregnancy.  Dental care considerations  · Tell your dentist if you are pregnant or you plan to become pregnant.  · If you are pregnant, avoid routine X-ray exams until after your baby is born. If you are trying to become pregnant, you do not need to avoid X-rays.  ? If you need an emergency procedure that includes a dental X-ray exam during pregnancy, very low levels of radiation will be used, and lead aprons can be used to protect you from radiation.  · Your dentist will discuss the risks and benefits of having dental procedures during pregnancy. If possible, it is best to have dental procedures (such as cavity fillings and crown repair) during the second trimester of pregnancy or after your baby is born.  · If you and your dentist decide to postpone a procedure for any reason, your dentist can recommend treatment to lower the chances of infection until the procedure is performed. This may involve taking certain medicines that are safe to take during pregnancy, such as penicillin or amoxicillin.  Follow these instructions at home:    · Practice good oral hygiene habits at home:  ? Brush your teeth twice a day with fluoride toothpaste. Brush thoroughly for at least 2 minutes. If you have morning  sickness, avoid strongly-flavored toothpastes.  ? Floss at least once a day.  · Visit your dentist to have regular oral exams and cleanings, and if you experience oral problems.  · Eat a well-balanced diet that is low in sugar and carbohydrates.  · If you vomit, rinse your mouth with water afterward.  · Keep all follow-up visits as told by your dentist. This is important.  Seek dental care if:  · You develop any of the following oral symptoms or they get worse:  ? Pain.  ? Bleeding.  ? Swelling.  ? Inflammation.  · You develop growths or swelling between teeth.  Get help right away if:  · You have a fever or chills.  Summary  · Proper dental care before, during, and after pregnancy is important for you and your baby.  · If you are pregnant, routine X-ray exams should be avoided until after your baby is born.  · Your dentist will help you consider the risks and benefits of dental procedures during pregnancy.  · You should brush your teeth with fluoride toothpaste twice a day and floss at least once a day.  This information is not intended to replace advice given to you by your health care provider. Make sure you discuss any questions you have with your health care provider.  Document Released: 06/07/2011 Document Revised: 12/02/2017 Document Reviewed: 12/02/2017  [employer] Interactive Patient Education © 2019 [employer]    Pregnancy and Travel      Most pregnant women can safely travel until the last month of their pregnancy. Your doctor may recommend limiting or avoiding travel depending on how far you are in the pregnancy, and if you have any medical or pregnancy problems.  General travel tips  Before you go:  · Discuss your trip with your doctor. Get examined shortly before you go.  · Get a copy of your medical records. Take it with you.  · Try to get names of doctors and hospitals in the area where you will be visiting.  · Pack your pillow.  · Pack any approved medicines and supplements.  · Get enough sleep the  night before the trip.  During your trip:  · Ask for locations of doctors and hospitals.  · Wear flat, comfortable shoes.  · Wear loose-fitting, comfortable clothes.  · Wear compression stockings as told by your doctor. They may prevent blood clots that arise from sitting for a long time.  · Do leg exercises as told by your doctor.  · Eat a balanced diet, drink lots of fluid, and take your vitamins and supplements.  · Take water, crackers, and fruit with you.  · Take breaks to use the restroom and walk every 2 hours or during stops.  · Do not wear yourself out.  · Do not ride on a motorcycle.  · Rest. If your trip is long, lie down for 30 or more minutes with your feet slightly raised after you reach your destination.  · Always wear a seat belt.  Tips for traveling to a foreign country  Before you go:  · Ask your doctor if there are medicines that are safe for you to take if you get diarrhea, constipation, nausea, or vomiting.  · Check with your health insurance provider about medical coverage abroad. Purchase travel medical insurance, if needed.  · Make sure you are up to date on vaccines.  During your trip:  · Do not eat uncooked foods.  · Do not eat food from buffets or food that is cold or sitting at room temperature.  · Drink bottled beverages and water. Do not use ice.  · Wash fruits and vegetables with clean water. If possible, peel them before eating.  · Do not drink unpasteurized milk.  · Wear insect repellent if there are mosquitoes or other biting insects. Ask your doctor which repellents are safe.  What do I need to know about traveling by car?  · Wear your seat belt properly. The belt should be buckled below your abdomen, on your hip bones. The shoulder belt should be off to the side of your abdomen and across the center of your chest.  · If you are in the front seat, sit as far away from the dashboard as possible to avoid getting hit hard if the airbag deploys in an accident.  · Do not travel for more  than 5-6 hours a day.  What do I need to know about traveling by bus?  · Before making a reservation, ask whether your bus will have a restroom.  · Move your arms and legs when seated.  · If you have to use the restroom, hold on to the seats and handrails as you walk.  · Do not travel for more than 5-6 hours a day.  What do I need to know about traveling by train?  · Before making a reservation, ask if your train will have a sleeping car and more than one restroom.  · If you need to walk while the train is moving, hold on to seats and handrails.  · Move your arms and legs when seated.  · Do not travel for more than 5-6 hours a day.  What do I need to know about traveling by airplane?  · Before booking your trip, ask about the airline's rules about pregnancy. Pregnant women may be restricted from flying after a certain time of the pregnancy. Every airline has its own rules.  · Make sure you complete your trip before 36 weeks of pregnancy.  · Ask whether the airplane cabin will be pressurized. Do not board an unpressurized plane that will fly above 7,000 ft (2,100 m).  · Try to get a bulkhead or an aisle seat so it is easier to get up, stretch, and use the bathroom.  · Wear layers since the cabin temperature can change.  · Put all your medicines and medical records in your carry-on bag.  · Avoid drinking caffeinated or carbonated beverages.  · Avoid eating foods that may make you bloated.  · Do not eat a big meal.  · If you need to walk through the airplane, hold on to the seats and handrails.  · Move your arms and legs when seated.  · Wear your seat belt.  What do I need to know about traveling by [employer] ship?  · Before booking your trip, ask the [employer] company:  ? Are pregnant women allowed on the ship?  ? Is there a medical facility and doctor on board?  ? Does the ship dock in places where there are doctors and medical facilities?  · Before booking your trip, ask your doctor:  ? Is it safe to take medicines  if I get seasick?  ? Is it safe to wear acupressure wristbands to prevent seasickness? If the answer is yes, consider buying one.  Contact a health care provider if:  · You have diarrhea.  · You vomit.  · You have nausea or seasickness.  Get help right away if:  · You have vaginal bleeding.  · You have severe vomiting or diarrhea.  · You have pelvic or abdominal pain.  · You have contractions.  · Your water breaks.  · You have a persistent headache.  · Your eyesight changes or you see spots.  · Your face or hands are swollen.  · You have pain, warmth, or swelling in your legs or ankles.  Summary  · Most pregnant women can safely travel until the last month of their pregnancy. Your doctor may tell you to limit or avoid travel depending on how far you are in your pregnancy and if you have any medical or pregnancy problems.  · The best time to travel is between 14 and 28 weeks of your pregnancy.  · Before you go on your trip, make sure you discuss your trip with your health care provider, get a copy of your medical records, and try to get information on medical centers and doctors at your destination.  · While on your trip, make sure you wear comfortable clothes and shoes, eat a healthy diet, drink plenty of fluids, take your vitamins and supplements, take breaks and rest often, and wear your seat belt.  · Before booking a flight, ask about the airline's rules about pregnancy. Every airline has its own rules. Do the same for a train, bus, or cruise ship.  This information is not intended to replace advice given to you by your health care provider. Make sure you discuss any questions you have with your health care provider.  Document Released: 2009 Document Revised: 2018 Document Reviewed: 2018  [employer] Patient Education © 2019 [employer]    Pregnancy and Sex  Your sex life may change during pregnancy as well as after your  arrives. It is normal to have questions about sex during  pregnancy. All women are affected differently by pregnancy hormones. You may notice an increase or decrease in your sexual drive throughout your pregnancy. Also, your partner's attitude and sexual drive may change. Share the information in this document with your partner. Talk openly about how you feel about sex.  When is it safe to have sex during pregnancy?  Sex is generally considered safe throughout a normal low-risk pregnancy. Remember:  · The fetus is protected by the uterus and the fluid-filled sac that surrounds the fetus (amniotic sac).  · The cervix is closed or sealed during pregnancy.  · The penis does not reach or harm the fetus during sex.  · Sex and orgasms are not thought to cause miscarriages or early labor.  · If you use lubricants, use a water-soluble product.  What risk factors make it unsafe to have sex while pregnant?  The following complications or risk factors may make it necessary to limit sexual activity:  · You have a history of miscarriage or  labor.  · You have bleeding, discharge, fluid leakage, or contractions.  · Your placenta may be partially covering or completely covering the opening to the cervix (placenta previa).  · Your cervix is weak and opens easily (incompetent cervix).  · Your partner has an STD (sexually transmitted disease). Avoid sex with the infected person or use a condom to prevent infection to the fetus.  · You are unsure of your partner's sexual history. Avoid sex or use condoms.  · You are having twins, triples, or other multiples.  Your health care provider will help you determine whether sex during your pregnancy is safe.  What practices are unsafe?  · If you engage in oral sex, you should avoid having your partner blow air into your vagina. Although very rare, this can send a dangerous air bubble into your bloodstream.  · Anal sex is generally safe during pregnancy, but there can be a risk of spreading bacteria from the rectum and aggravating any  hemorrhoids.  This information is not intended to replace advice given to you by your health care provider. Make sure you discuss any questions you have with your health care provider.  Document Released: 06/07/2011 Document Revised: 08/15/2017 Document Reviewed: 06/08/2017  [employer] Patient Education © 2019 [employer]

## 2022-01-17 NOTE — PROGRESS NOTES
Confirmation of pregnancy     Chief Complaint   Patient presents with   • Amenorrhea         Bernardo Tapia is being seen today for evaluation of absence of menses. Due to her period being late, she tested for pregnancy and had + home UPT. She is a 31 y.o. . This problem is new to me, the examiner.       LNMP: 21  Confident with date: Yes, h/o irregular periods   Taking prenatal vitamins: Yes. Needs RX: No  Planned pregnancy: Yes  Prior obstetric issues, potential pregnancy concerns:   Family history of genetic issues (includes FOB): Pt is CF carrier.   Varicella Hx: As child   Flu vaccine: has had   COVID 19 vaccine: X 2. Booster vaccine: S/P Booster   History of STDs: denies   Current medications: PNV, atrovent, nasal spray, zyrtec and fiber   Last pap smear:   Smoker: No  Drug or alcohol abuse: No  H/O physical, emotional or sexual abuse: denies   H/O mental health disorder: h/o anxiety   Prior testing for Cystic Fibrosis Carrier or Sickle Cell Trait- + CF carrier   Prepregnancy BMI - Body mass index is 28.52 kg/m².    Past Medical History:   Diagnosis Date   • GERD (gastroesophageal reflux disease)        Past Surgical History:   Procedure Laterality Date   • WISDOM TOOTH EXTRACTION           Current Outpatient Medications:   •  cetirizine (zyrTEC) 10 MG tablet, Take 10 mg by mouth Daily., Disp: , Rfl:   •  ipratropium (ATROVENT) 0.06 % nasal spray, 2 sprays into the nostril(s) as directed by provider 4 (Four) Times a Day., Disp: , Rfl:   •  prenatal vitamin (prenatal, CLASSIC, vitamin) tablet, Take 1 tablet by mouth Daily., Disp: , Rfl:   •  multivitamin with minerals tablet tablet, Take 1 tablet by mouth Daily., Disp: , Rfl:     Allergies   Allergen Reactions   • Sulfa Antibiotics Nausea And Vomiting and Hives       Social History     Socioeconomic History   • Marital status:    Tobacco Use   • Smoking status: Never Smoker   • Smokeless tobacco: Never Used   Substance and Sexual  "Activity   • Alcohol use: Yes       Family History   Problem Relation Age of Onset   • No Known Problems Mother    • No Known Problems Father        Review of systems     Review of Systems   Constitutional: Negative.    Respiratory: Negative.    Cardiovascular: Negative.    Gastrointestinal: Negative.    Genitourinary: Negative.    Musculoskeletal: Negative.    Skin: Negative.    Neurological: Negative.    Psychiatric/Behavioral: Negative.        Objective    /74   Ht 160 cm (63\")   Wt 73 kg (161 lb)   LMP 11/11/2021   BMI 28.52 kg/m²     Physical Exam  Vitals and nursing note reviewed.   Constitutional:       Appearance: Normal appearance.   Pulmonary:      Effort: Pulmonary effort is normal.   Musculoskeletal:         General: Normal range of motion.   Skin:     General: Skin is warm and dry.   Neurological:      General: No focal deficit present.      Mental Status: She is alert and oriented to person, place, and time.   Psychiatric:         Mood and Affect: Mood normal.         Behavior: Behavior normal.         Assessment/Plan      1. Missed menses: + UPT in office. LNMP 11/11/21 = 9.4 weeks week EGA with an EDC=8/18/22. US IMP: Single, viable IUP @ 9.4 weeks gestation. EDC established 8/18/22. FHR 159bpm. Uterus AV. Ovaries wnl. Oriented to practice.     2. Pregnancy: Disc importance of regular prenatal care. Enc PNV daily. Counseled on providers and on call phone. Disc Tylenol products are ok and encouraged no ibuprofen or ASA in pregnancy.  Disc exercise in pregnancy, diet, expected weight gain, etc. Enc no use of tobacco, vaping, drugs, or alcohol during pregnancy. Rev warn s/s of SAB.     3. Labs: Pt counseled on genetic screening, Quad screen, AFP, and NIPS.     4.   Patient's Body mass index is 28.52 kg/m². indicating that she is overweight (BMI 25-29.9). Overweight women, with a BMI of 25-29, should gain approx 15-25 pounds for the entire pregnancy.     5.   Smoker- Non smoker     6.   COVID19 " precautions were reviewed with the patient. Continue to encourage social distancing, wearing a mask, and good hand hygiene.  I wore a mask, protective eye wear, and gloves during this patient encounter.  Patient also wearing a surgical mask and social distancing was observed. Hand hygeine performed before and after seeing the patient. Also encouraged COVID booster vaccine at 6 month interval from last COVID vaccine. Info provided on Covid vaccine: S/P COVID vaccine and booster     7.  Flu vaccine. Encouraged the flu vaccine during pregnancy. Discuss normal physiological changes during pregnancy increase the susceptibility of the flu virus and increase the risk of severe illness for the pregnant woman. Disc flu can be harmful to the unborn baby as well. Enc the flu vaccine. Disc with patient that getting the flu vaccine is the first and most important step in protecting against the flu. Flu vaccines given during pregnancy help protect both the mother and the baby. Getting the flu vaccine during pregnancy also helps protect the  from flu illness for several months after their birth, when then are too young to get vaccinated. Also disc the importance of good hand hygiene and avoiding people who are sick. S/P flu vaccine.     8.  Nausea- Enc small frequent meals. Enc vit B6 and Unsiom.     9.  Anxiety- No current meds. Stopped taking xanax PRN, adderall and fluoxetine prior. Reports is managing well. Disc R/B of medication use if needed in pregnancy and postpartum. Disc increased risk of postpartum depression/anxiety.     10.  CF carrier- Pt's partner has been tested and is not a carrier but need results for copy in chart.     All questions answered.       Encounter Diagnoses   Name Primary?   • Missed menses Yes       Diagnoses and all orders for this visit:    Missed menses  -     POC Pregnancy, Urine    Other orders  -     prenatal vitamin (prenatal, CLASSIC, vitamin) tablet; Take 1 tablet by mouth Daily.  -      ipratropium (ATROVENT) 0.06 % nasal spray; 2 sprays into the nostril(s) as directed by provider 4 (Four) Times a Day.        RTO in 2 weeks for new OB exam and labs     I spent 30 minutes caring for Bernardo on this date of service. This time includes time spent by me in the following activities: preparing for the visit, reviewing tests, obtaining and/or reviewing a separately obtained history, performing a medically appropriate examination and/or evaluation, counseling and educating the patient/family/caregiver, ordering medications, tests, or procedures, documenting information in the medical record and independently interpreting results and communicating that information with the patient/family/caregiver      Rose Waldrop, REZA  1/17/2022  14:33 EST

## 2022-01-19 PROBLEM — N92.6 MISSED MENSES: Status: ACTIVE | Noted: 2022-01-19

## 2022-01-19 PROBLEM — Z32.01 POSITIVE URINE PREGNANCY TEST: Status: ACTIVE | Noted: 2022-01-19

## 2022-01-19 PROBLEM — Z14.1 CYSTIC FIBROSIS CARRIER: Status: ACTIVE | Noted: 2022-01-19

## 2022-01-31 ENCOUNTER — INITIAL PRENATAL (OUTPATIENT)
Dept: OBSTETRICS AND GYNECOLOGY | Facility: CLINIC | Age: 32
End: 2022-01-31

## 2022-01-31 VITALS — WEIGHT: 159 LBS | BODY MASS INDEX: 28.17 KG/M2 | SYSTOLIC BLOOD PRESSURE: 120 MMHG | DIASTOLIC BLOOD PRESSURE: 76 MMHG

## 2022-01-31 DIAGNOSIS — Z36.9 ENCOUNTER FOR ANTENATAL SCREENING, UNSPECIFIED: ICD-10-CM

## 2022-01-31 DIAGNOSIS — Z34.91 INITIAL OBSTETRIC VISIT IN FIRST TRIMESTER: Primary | ICD-10-CM

## 2022-01-31 LAB
GLUCOSE UR STRIP-MCNC: NEGATIVE MG/DL
PROT UR STRIP-MCNC: NEGATIVE MG/DL

## 2022-01-31 PROCEDURE — 0501F PRENATAL FLOW SHEET: CPT | Performed by: NURSE PRACTITIONER

## 2022-01-31 NOTE — PROGRESS NOTES
Initial ob visit     Chief Complaint   Patient presents with   • Initial Prenatal Visit       Bernardo Tapia is being seen today for her first obstetrical visit.  She is a 31 y.o.  @ 11w4d gestation. This problem is new to me: no      # 1 - Date: None, Sex: None, Weight: None, GA: None, Delivery: None, Apgar1: None, Apgar5: None, Living: None, Birth Comments: None    LNMP: 21  Confident with date: Yes,  But h/o irregular periods   Taking prenatal vitamins: Yes. Needs RX: No  Planned pregnancy: Yes  Prior obstetric issues, potential pregnancy concerns:   Family history of genetic issues (includes FOB): Pt is CF carrier.   Varicella Hx: As child   Flu vaccine: has had   COVID 19 vaccine: X 2. Booster vaccine: S/P Booster   History of STDs: denies   Current medications: PNV, atrovent, nasal spray, zyrtec and fiber   Last pap smear:   Smoker: No  Drug or alcohol abuse: No  H/O physical, emotional or sexual abuse: denies   H/O mental health disorder: h/o anxiety   Prior testing for Cystic Fibrosis Carrier or Sickle Cell Trait- + CF carrier   Prepregnancy BMI - Body mass index is 28.52 kg/m².     Prepregnancy BMI: Body mass index is 28.17 kg/m².      Past Medical History:   Diagnosis Date   • Anxiety    • GERD (gastroesophageal reflux disease)        Past Surgical History:   Procedure Laterality Date   • ORTHOPEDIC SURGERY     • WISDOM TOOTH EXTRACTION           Current Outpatient Medications:   •  cetirizine (zyrTEC) 10 MG tablet, Take 10 mg by mouth Daily., Disp: , Rfl:   •  ipratropium (ATROVENT) 0.06 % nasal spray, 2 sprays into the nostril(s) as directed by provider 4 (Four) Times a Day., Disp: , Rfl:   •  multivitamin with minerals tablet tablet, Take 1 tablet by mouth Daily., Disp: , Rfl:   •  prenatal vitamin (prenatal, CLASSIC, vitamin) tablet, Take 1 tablet by mouth Daily., Disp: , Rfl:     Allergies   Allergen Reactions   • Sulfa Antibiotics Nausea And Vomiting and Hives       Social  History     Socioeconomic History   • Marital status:    Tobacco Use   • Smoking status: Never Smoker   • Smokeless tobacco: Never Used   Substance and Sexual Activity   • Alcohol use: Yes   • Sexual activity: Yes     Partners: Male       Family History   Problem Relation Age of Onset   • Cystic fibrosis Mother    • Breast cancer Mother    • No Known Problems Father        Review of systems     All other systems reviewed and are negative except for: Genitourinary: positive for cramping     Objective    /76   Wt 72.1 kg (159 lb)   LMP 11/11/2021   BMI 28.17 kg/m²       General Appearance:    Alert, cooperative, in no acute distress, habitus overweight     Head:    Not examined   Eyes:           Not examined   Ears:  Not examined       Neck:  No thyroid enlargement or nodules present   Back:     No kyphosis present, no scoliosis present,                       Lungs:     Clear to auscultation,respirations regular, even and                   unlabored    Heart:    Regular rhythm and normal rate, normal S1 and S2, no            murmur, no gallop, no rub, no click   Breast Exam:    Def   Abdomen:     Normal bowel sounds, no masses, no organomegaly, soft        non-tender, non-distended, no guarding, no rebound                 tenderness   Genitalia:  Def   Extremities:   Moves all extremities well, no edema, no cyanosis, no              redness       Skin:   No bleeding, bruising or rash       Neurologic:   Sensation intact, A&O times 3      Assessment/Plan    1) Pregnancy at 11w4d- Had an US @ 9 weeks established EDC 8/18/22. + FHTs by doppler today.     2) OB exam: OB exam completed: Yes. New OB bag provided Yes. Pap collected: No.    3) Labs: OB labs collected: Yes Counseled on genetic screening: Yes, she desires CF, SMA and FX. Counseled on Quad screen and AFP: Yes, she is to early for AFP. Counseled on NIPS: Yes, she desires NIPS.      4) Patient's Body mass index is 28.17 kg/m². indicating that she  is overweight (BMI 25-29.9).  Overweight women, with a BMI of 25-29, should gain approx 15-25 pounds for the entire pregnancy.     5)  Prenatal care: Oriented to the office and prenatal care. Encourage prenatal vitamins. Disc Tylenol products are fine, avoid aspirin and ibuprofen; Zika (travel restrictions/ok to use insect repellant); not to change cat litter; food restrictions; exercise;  avoidance of alcohol, tobacco, drugs and saunas/hot tubs.     6) COVID19 precautions were reviewed with the patient. Continue to encourage social distancing, wearing a mask, and good hand hygiene.  I wore a mask, protective eye wear, and gloves during this patient encounter.  Patient also wearing a surgical mask and social distancing was observed. Hand hygeine performed before and after seeing the patient. Info provided on Covid vaccine: S/P COVID vaccine and booster     7) S/P Flu vaccine    8) CF carrier- Pt's mother has CF. Her partner has been tested but has only had a 32 gene panel.  Check CF, SMA and FX with [facility].     9) Cramping- No bleeding. Rev warn s/s. Disc cramping can be normal from uterine stretching.     10) Exercise- Pt was cycling prior to pregnancy but has recently stopped d/t N/V. Disc ok to resume activity. Rev warn s/s.      All questions answered.     RTO 4 weeks for OB tummy and AFP     Rose Waldrop, APRN    1/31/2022  16:31 EST

## 2022-02-01 LAB
ABO GROUP BLD: NORMAL
BASOPHILS # BLD AUTO: 0.1 X10E3/UL (ref 0–0.2)
BASOPHILS NFR BLD AUTO: 1 %
BLD GP AB SCN SERPL QL: NEGATIVE
EOSINOPHIL # BLD AUTO: 0.1 X10E3/UL (ref 0–0.4)
EOSINOPHIL NFR BLD AUTO: 1 %
ERYTHROCYTE [DISTWIDTH] IN BLOOD BY AUTOMATED COUNT: 12.6 % (ref 11.7–15.4)
HBA1C MFR BLD: 5.2 % (ref 4.8–5.6)
HBV SURFACE AG SERPL QL IA: NEGATIVE
HCT VFR BLD AUTO: 40.3 % (ref 34–46.6)
HCV AB S/CO SERPL IA: <0.1 S/CO RATIO (ref 0–0.9)
HGB BLD-MCNC: 13.9 G/DL (ref 11.1–15.9)
HIV 1+2 AB+HIV1 P24 AG SERPL QL IA: NON REACTIVE
IMM GRANULOCYTES # BLD AUTO: 0 X10E3/UL (ref 0–0.1)
IMM GRANULOCYTES NFR BLD AUTO: 0 %
LYMPHOCYTES # BLD AUTO: 2.3 X10E3/UL (ref 0.7–3.1)
LYMPHOCYTES NFR BLD AUTO: 24 %
MCH RBC QN AUTO: 29.8 PG (ref 26.6–33)
MCHC RBC AUTO-ENTMCNC: 34.5 G/DL (ref 31.5–35.7)
MCV RBC AUTO: 86 FL (ref 79–97)
MONOCYTES # BLD AUTO: 0.6 X10E3/UL (ref 0.1–0.9)
MONOCYTES NFR BLD AUTO: 6 %
NEUTROPHILS # BLD AUTO: 6.3 X10E3/UL (ref 1.4–7)
NEUTROPHILS NFR BLD AUTO: 68 %
PLATELET # BLD AUTO: 287 X10E3/UL (ref 150–450)
RBC # BLD AUTO: 4.67 X10E6/UL (ref 3.77–5.28)
RH BLD: POSITIVE
RPR SER QL: NON REACTIVE
RUBV IGG SERPL IA-ACNC: 1.55 INDEX
VZV IGG SER IA-ACNC: 977 INDEX
WBC # BLD AUTO: 9.3 X10E3/UL (ref 3.4–10.8)

## 2022-02-02 LAB
AMPHETAMINES UR QL SCN: NEGATIVE NG/ML
BACTERIA UR CULT: NORMAL
BACTERIA UR CULT: NORMAL
BARBITURATES UR QL SCN: NEGATIVE NG/ML
BENZODIAZ UR QL SCN: NEGATIVE NG/ML
BZE UR QL SCN: NEGATIVE NG/ML
C TRACH RRNA SPEC QL NAA+PROBE: NEGATIVE
CANNABINOIDS UR QL SCN: NEGATIVE NG/ML
CREAT UR-MCNC: 124.6 MG/DL (ref 20–300)
LABORATORY COMMENT REPORT: NORMAL
METHADONE UR QL SCN: NEGATIVE NG/ML
N GONORRHOEA RRNA SPEC QL NAA+PROBE: NEGATIVE
OPIATES UR QL SCN: NEGATIVE NG/ML
OXYCODONE+OXYMORPHONE UR QL SCN: NEGATIVE NG/ML
PCP UR QL: NEGATIVE NG/ML
PH UR: 6.3 [PH] (ref 4.5–8.9)
PROPOXYPH UR QL SCN: NEGATIVE NG/ML
T VAGINALIS DNA SPEC QL NAA+PROBE: NEGATIVE

## 2022-03-01 ENCOUNTER — ROUTINE PRENATAL (OUTPATIENT)
Dept: OBSTETRICS AND GYNECOLOGY | Facility: CLINIC | Age: 32
End: 2022-03-01

## 2022-03-01 VITALS — DIASTOLIC BLOOD PRESSURE: 72 MMHG | WEIGHT: 165.2 LBS | BODY MASS INDEX: 29.26 KG/M2 | SYSTOLIC BLOOD PRESSURE: 128 MMHG

## 2022-03-01 DIAGNOSIS — Z3A.15 15 WEEKS GESTATION OF PREGNANCY: ICD-10-CM

## 2022-03-01 DIAGNOSIS — Z14.1 CYSTIC FIBROSIS CARRIER: Primary | ICD-10-CM

## 2022-03-01 PROBLEM — Z32.01 POSITIVE URINE PREGNANCY TEST: Status: RESOLVED | Noted: 2022-01-19 | Resolved: 2022-03-01

## 2022-03-01 PROBLEM — Z34.90 PREGNANCY: Status: ACTIVE | Noted: 2022-03-01

## 2022-03-01 PROBLEM — N92.6 MISSED MENSES: Status: RESOLVED | Noted: 2022-01-19 | Resolved: 2022-03-01

## 2022-03-01 LAB
GLUCOSE UR STRIP-MCNC: NEGATIVE MG/DL
PROT UR STRIP-MCNC: NEGATIVE MG/DL

## 2022-03-01 PROCEDURE — 0502F SUBSEQUENT PRENATAL CARE: CPT | Performed by: OBSTETRICS & GYNECOLOGY

## 2022-03-01 NOTE — PROGRESS NOTES
Pt is a 31 y.o. @15w5d here for ob check.  I saw the patient with a face mask, gloves and eye protection  The patient herself was masked.  Social distancing was observed as appropriate. All COVID precautions observed.   Pt complains of headaches.  No visual changes. Counseled.  Pt is CF carrier, and  requests full [facility] panel.  Pt desires AFP after counseling.  This stage of pregnancy reviewed.  All questions answered.

## 2022-03-07 LAB
AFP INTERP SERPL-IMP: NORMAL
AFP INTERP SERPL-IMP: NORMAL
AFP MOM SERPL: 0.95
AFP SERPL-MCNC: 28.9 NG/ML
AGE AT DELIVERY: 33.6 YR
GA METHOD: NORMAL
GA: 15.7 WEEKS
IDDM PATIENT QL: NORMAL
LABORATORY COMMENT REPORT: NORMAL
MULTIPLE PREGNANCY: NO
NEURAL TUBE DEFECT RISK FETUS: NORMAL %
RESULT: NORMAL

## 2022-03-11 DIAGNOSIS — Z14.1 CYSTIC FIBROSIS CARRIER: Primary | ICD-10-CM

## 2022-03-29 ENCOUNTER — ROUTINE PRENATAL (OUTPATIENT)
Dept: OBSTETRICS AND GYNECOLOGY | Facility: CLINIC | Age: 32
End: 2022-03-29

## 2022-03-29 VITALS — WEIGHT: 171.4 LBS | DIASTOLIC BLOOD PRESSURE: 76 MMHG | BODY MASS INDEX: 30.36 KG/M2 | SYSTOLIC BLOOD PRESSURE: 124 MMHG

## 2022-03-29 DIAGNOSIS — Z87.59 H/O ONE MISCARRIAGE: ICD-10-CM

## 2022-03-29 DIAGNOSIS — Z14.1 CYSTIC FIBROSIS CARRIER: Primary | ICD-10-CM

## 2022-03-29 DIAGNOSIS — Z3A.19 19 WEEKS GESTATION OF PREGNANCY: ICD-10-CM

## 2022-03-29 LAB
GLUCOSE UR STRIP-MCNC: NEGATIVE MG/DL
PROT UR STRIP-MCNC: NEGATIVE MG/DL

## 2022-03-29 PROCEDURE — 0502F SUBSEQUENT PRENATAL CARE: CPT | Performed by: OBSTETRICS & GYNECOLOGY

## 2022-03-29 NOTE — PROGRESS NOTES
Pt is a 31 y.o. @19w5d here for ob check and anatomy scan. Us wnl:Normal anatomy, female, CL = 4.0cm  I saw the patient with a face mask, gloves and eye protection  The patient herself was masked.  Social distancing was observed as appropriate. All COVID precautions observed.   All questions answered.  This stage of pregnancy reviewed.    Will order MFM consult.

## 2022-04-14 ENCOUNTER — HOSPITAL ENCOUNTER (OUTPATIENT)
Dept: ULTRASOUND IMAGING | Facility: HOSPITAL | Age: 32
Discharge: HOME | End: 2022-04-14
Admitting: OBSTETRICS & GYNECOLOGY

## 2022-04-14 ENCOUNTER — TRANSCRIBE ORDERS (OUTPATIENT)
Dept: ULTRASOUND IMAGING | Facility: HOSPITAL | Age: 32
End: 2022-04-14

## 2022-04-14 ENCOUNTER — OFFICE VISIT (OUTPATIENT)
Dept: OBSTETRICS AND GYNECOLOGY | Facility: CLINIC | Age: 32
End: 2022-04-14

## 2022-04-14 VITALS
SYSTOLIC BLOOD PRESSURE: 125 MMHG | TEMPERATURE: 98.7 F | HEART RATE: 103 BPM | DIASTOLIC BLOOD PRESSURE: 86 MMHG | WEIGHT: 175.2 LBS | BODY MASS INDEX: 31.04 KG/M2

## 2022-04-14 DIAGNOSIS — Z14.1 CYSTIC FIBROSIS CARRIER: Primary | ICD-10-CM

## 2022-04-14 DIAGNOSIS — O44.41: ICD-10-CM

## 2022-04-14 DIAGNOSIS — Z14.1 CYSTIC FIBROSIS CARRIER: ICD-10-CM

## 2022-04-14 DIAGNOSIS — O35.2XX0 HEREDITARY DISEASE IN FAMILY POSSIBLY AFFECTING FETUS, AFFECTING MANAGEMENT OF MOTHER IN PREGNANCY, SINGLE OR UNSPECIFIED FETUS: ICD-10-CM

## 2022-04-14 PROCEDURE — 76817 TRANSVAGINAL US OBSTETRIC: CPT

## 2022-04-14 PROCEDURE — 76811 OB US DETAILED SNGL FETUS: CPT

## 2022-04-14 PROCEDURE — 76811 OB US DETAILED SNGL FETUS: CPT | Performed by: OBSTETRICS & GYNECOLOGY

## 2022-04-14 PROCEDURE — 99215 OFFICE O/P EST HI 40 MIN: CPT | Performed by: OBSTETRICS & GYNECOLOGY

## 2022-04-14 PROCEDURE — 76817 TRANSVAGINAL US OBSTETRIC: CPT | Performed by: OBSTETRICS & GYNECOLOGY

## 2022-04-14 NOTE — PROGRESS NOTES
Consultation:     Bernardo Tapia is a 31 y.o.  female G 1 P 0 LMP November NERISSA 22 by U/S and LMP  now at 22 weeks seen in consultation as requested by Alexis Martinez* secondary to:    1) CF carrier- [id number] mutation.  Her  Yong has a different CF mutation - [id number][T(S)}  2) Low-lying placenta found on U/S today    Vitals:    22 0933   BP: 125/86   Pulse: 103   Temp: 98.7 °F (37.1 °C)       Pre-tyesha Labs:    Hemoglobin   Date Value Ref Range Status   2022 13.9 11.1 - 15.9 g/dL Final     Hematocrit   Date Value Ref Range Status   2022 40.3 34.0 - 46.6 % Final     Platelets   Date Value Ref Range Status   2022 287 150 - 450 x10E3/uL Final     Rubella Antibodies, IgG   Date Value Ref Range Status   2022 1.55 Immune >0.99 index Final     Comment:                                     Non-immune       <0.90                                  Equivocal  0.90 - 0.99                                  Immune           >0.99       Hepatitis B Surface Ag   Date Value Ref Range Status   2022 Negative Negative Final     RPR   Date Value Ref Range Status   2022 Non Reactive Non Reactive Final     ABO Type   Date Value Ref Range Status   2022 O  Final     Rh Factor   Date Value Ref Range Status   2022 Positive  Final     Comment:     Please note: Prior records for this patient's ABO / Rh type are not  available for additional verification.       Antibody Screen   Date Value Ref Range Status   2022 Negative Negative Final     HIV Screen 4th Gen w/RFX (Reference)   Date Value Ref Range Status   2022 Non Reactive Non Reactive Final     Comment:     HIV Negative  HIV-1/HIV-2 antibodies and HIV-1 p24 antigen were NOT detected.  There is no laboratory evidence of HIV infection.       Urine Culture   Date Value Ref Range Status   2022 Final report  Final     Result 1   Date Value Ref Range Status   2022 Lactobacillus  species  Final     Comment:     25,000-50,000 colony forming units per mL  Susceptibility not normally performed on this organism.       Gonococcus by TED   Date Value Ref Range Status   2022 Negative Negative Final     Chlamydia trachomatis, TED   Date Value Ref Range Status   2022 Negative Negative Final     Note:   Date Value Ref Range Status   11/10/2021 Comment  Final     Comment:     The Pap smear is a screening test designed to aid in the detection of  premalignant and malignant conditions of the uterine cervix.  It is not a  diagnostic procedure and should not be used as the sole means of detecting  cervical cancer.  Both false-positive and false-negative reports do occur.         Previous Obstetrical History:    OB History    Para Term  AB Living   1             SAB IAB Ectopic Molar Multiple Live Births                    # Outcome Date GA Lbr Tray/2nd Weight Sex Delivery Anes PTL Lv   1 Current              None    Previous Gyn History:    Patient denies abnormal PAP/GC/Chlamydia/Syphilis.    [facility] -  13 x 28-30 x 6-7  Contraception - OCP      Previous Medical History:      DM - patient denies HTN - patient denies Asthma - patient denies      Thyroid Disease - patient denies  Rheumatic Fever - patient denies  Heart - patient denies   Lung - patient denies   Liver - patient denies  Kidney - patient denies   Fever - patient denies  TB - patient denies Herpes - patient denies    UTI - patient denies   Epilepsy - patient denies  Other - neg    Previous Surgical History:    1) Age 18 - [place] teeth extraction without complications  2)  - Ganglion cyst removal without complications    Medications:    1) PNV, Zyrtec QD, Unisom and Vit B6, Metamucil      Allergies:    Sulfa - N&V    Allergies   Allergen Reactions   • Sulfa Antibiotics Hives and Nausea And Vomiting         Current Outpatient Medications on File Prior to Visit   Medication Sig Dispense Refill   • cetirizine (zyrTEC)  10 MG tablet Take 10 mg by mouth Daily.     • ipratropium (ATROVENT) 0.06 % nasal spray 2 sprays into the nostril(s) as directed by provider 4 (Four) Times a Day.     • multivitamin with minerals tablet tablet Take 1 tablet by mouth Daily.     • prenatal vitamin (prenatal, CLASSIC, vitamin) tablet Take 1 tablet by mouth Daily.       No current facility-administered medications on file prior to visit.        Habits:    Smoking - neg  Drinking - neg  Drugs - neg    Psychosocial:    ;     Sexual Abuse History: never  Physical Abuse History: never  Verbal Abuse History: never    Family History:    DM - neg  HTN - M Twins - neg Stillborns - neg  Birth defects - neg Mental Retardation - neg  Blood Dyscrasias - neg    Anesthesia Complications - neg Genetic - M. U and cousin with CF  Other - neg    Review of Systems   Otherwise negative      Remainder of exam deferred.      CYSTIC FIBROSIS:    General counseling was then performed regarding Cystic Fibrosis.  Cystic Fibrosis is the most common Mendelian abnormality seen in Caucasians.  Cystic Fibrosis is caused by a mutation the Cystic Fibrosis Transregulator (CFTR) gene which is located on chromosome 7.  Carrier rate is approximately 1/21.  The autosomal recessive nature of this condition as well as typical complications including pulmonary disease, pancreatic disease and fertility problems were discussed.  A recurrence rate of 25 % was given.  Prenatal diagnosis is possible through CVS or amniocentesis.  Hyperechoic bowel is reported to be present in 50-78% of CF fetuses in the second and third trimester.  High frequency transducers utilized in /S may tend to accentuate the echogenicity of fetal bowel.    The frequency of CF in Hispanics is 1/8,000, the frequency of CF in  Americans is 1/15,300 and the incidence in  Americans is 1/32,000.    Both parents have a CF mutation which would yield a 25% risk for the fetus having CF.  The risks  and benefits of amniocentesis including a 1/350-1/500 risk of pregnancy loss were explained.    Amniocentesis declined.    PLACENTA PREVIA:    General counseling was then performed regarding placenta previa.  Ninety percent of placenta previa diagnosed in the early second trimester will resolve by term.  There is an association with IUGR therefore serial U/S every 4 weeks for placental location and fetal growth is indicated.  Patients have a 4-8% risk of recurrence in subsequent pregnancies.      IMPRESSIONS:    1) CF carriers  2) Low-lying placenta    RECOMMENDATIONS:    1) Pelvic rest  2) Serial U/S every 4 weeks for fetal growth, placental localization and evaluation of fetal bowel (possible marker for CF if echogenic      Total time spent TODAY on this encounter, including pre-visit review of separately obtained history, face-to-face interaction including greater than 50% time spent in consultation performing medically appropriate physical exam, patient counseling/education with greater than 50% in counseling, interpretation of diagnostic results, care coordination and documentation was 40 minutes.      Thank you for utilizing our ultrasound and consultative services.  If I may be of any further service, please do not hesitate to contact me.    Sincerely,    Martín Richardson MD  Maternal-Fetal Medicine

## 2022-04-14 NOTE — PROGRESS NOTES
Pt reports that she is doing well and denies vaginal bleeding, cramping, contractions, LOF. Reports some fetal movements, nothing consistent. Next appointment with OB is scheduled for 2 weeks.      Vitals:    04/14/22 0933   BP: 125/86   Pulse: 103   Temp: 98.7 °F (37.1 °C)

## 2022-04-18 PROBLEM — E70.1: Status: ACTIVE | Noted: 2022-04-18

## 2022-04-18 PROBLEM — E70.1: Status: RESOLVED | Noted: 2022-04-18 | Resolved: 2022-04-18

## 2022-04-26 ENCOUNTER — ROUTINE PRENATAL (OUTPATIENT)
Dept: OBSTETRICS AND GYNECOLOGY | Facility: CLINIC | Age: 32
End: 2022-04-26

## 2022-04-26 VITALS — BODY MASS INDEX: 31.53 KG/M2 | DIASTOLIC BLOOD PRESSURE: 82 MMHG | SYSTOLIC BLOOD PRESSURE: 124 MMHG | WEIGHT: 178 LBS

## 2022-04-26 DIAGNOSIS — L29.9 ITCHING: ICD-10-CM

## 2022-04-26 DIAGNOSIS — O44.41: ICD-10-CM

## 2022-04-26 DIAGNOSIS — Z3A.23 23 WEEKS GESTATION OF PREGNANCY: Primary | ICD-10-CM

## 2022-04-26 LAB
GLUCOSE UR STRIP-MCNC: NEGATIVE MG/DL
PROT UR STRIP-MCNC: NEGATIVE MG/DL

## 2022-04-26 PROCEDURE — 0502F SUBSEQUENT PRENATAL CARE: CPT | Performed by: OBSTETRICS & GYNECOLOGY

## 2022-04-28 LAB
ALBUMIN SERPL-MCNC: 3.4 G/DL (ref 3.8–4.8)
ALBUMIN/GLOB SERPL: 1.5 {RATIO} (ref 1.2–2.2)
ALP SERPL-CCNC: 71 IU/L (ref 44–121)
ALT SERPL-CCNC: 11 IU/L (ref 0–32)
AST SERPL-CCNC: 16 IU/L (ref 0–40)
BILE AC SERPL-SCNC: 3.1 UMOL/L (ref 0–10)
BILIRUB SERPL-MCNC: <0.2 MG/DL (ref 0–1.2)
BUN SERPL-MCNC: 9 MG/DL (ref 6–20)
BUN/CREAT SERPL: 16 (ref 9–23)
CALCIUM SERPL-MCNC: 9 MG/DL (ref 8.7–10.2)
CHLORIDE SERPL-SCNC: 103 MMOL/L (ref 96–106)
CO2 SERPL-SCNC: 21 MMOL/L (ref 20–29)
CREAT SERPL-MCNC: 0.58 MG/DL (ref 0.57–1)
EGFRCR SERPLBLD CKD-EPI 2021: 122 ML/MIN/1.73
ERYTHROCYTE [DISTWIDTH] IN BLOOD BY AUTOMATED COUNT: 12.4 % (ref 11.7–15.4)
GLOBULIN SER CALC-MCNC: 2.3 G/DL (ref 1.5–4.5)
GLUCOSE SERPL-MCNC: 72 MG/DL (ref 65–99)
HCT VFR BLD AUTO: 36.6 % (ref 34–46.6)
HGB BLD-MCNC: 12.5 G/DL (ref 11.1–15.9)
MCH RBC QN AUTO: 30.2 PG (ref 26.6–33)
MCHC RBC AUTO-ENTMCNC: 34.2 G/DL (ref 31.5–35.7)
MCV RBC AUTO: 88 FL (ref 79–97)
PLATELET # BLD AUTO: 313 X10E3/UL (ref 150–450)
POTASSIUM SERPL-SCNC: 4 MMOL/L (ref 3.5–5.2)
PROT SERPL-MCNC: 5.7 G/DL (ref 6–8.5)
RBC # BLD AUTO: 4.14 X10E6/UL (ref 3.77–5.28)
SODIUM SERPL-SCNC: 138 MMOL/L (ref 134–144)
WBC # BLD AUTO: 10.1 X10E3/UL (ref 3.4–10.8)

## 2022-05-12 ENCOUNTER — OFFICE VISIT (OUTPATIENT)
Dept: OBSTETRICS AND GYNECOLOGY | Facility: CLINIC | Age: 32
End: 2022-05-12

## 2022-05-12 ENCOUNTER — HOSPITAL ENCOUNTER (OUTPATIENT)
Dept: ULTRASOUND IMAGING | Facility: HOSPITAL | Age: 32
Discharge: HOME | End: 2022-05-12
Admitting: OBSTETRICS & GYNECOLOGY

## 2022-05-12 VITALS
DIASTOLIC BLOOD PRESSURE: 88 MMHG | SYSTOLIC BLOOD PRESSURE: 131 MMHG | HEART RATE: 105 BPM | BODY MASS INDEX: 32.1 KG/M2 | TEMPERATURE: 98.7 F | WEIGHT: 181.2 LBS

## 2022-05-12 DIAGNOSIS — O44.41: ICD-10-CM

## 2022-05-12 DIAGNOSIS — Z03.72 SUSPECTED PROBLEM WITH PLACENTA NOT FOUND: ICD-10-CM

## 2022-05-12 DIAGNOSIS — O35.2XX0 HEREDITARY DISEASE IN FAMILY POSSIBLY AFFECTING FETUS, AFFECTING MANAGEMENT OF MOTHER IN PREGNANCY, SINGLE OR UNSPECIFIED FETUS: ICD-10-CM

## 2022-05-12 DIAGNOSIS — Z14.1 CYSTIC FIBROSIS CARRIER: ICD-10-CM

## 2022-05-12 DIAGNOSIS — Z14.1 CYSTIC FIBROSIS CARRIER: Primary | ICD-10-CM

## 2022-05-12 PROCEDURE — 76816 OB US FOLLOW-UP PER FETUS: CPT

## 2022-05-12 PROCEDURE — 76816 OB US FOLLOW-UP PER FETUS: CPT | Performed by: OBSTETRICS & GYNECOLOGY

## 2022-05-12 PROCEDURE — 76817 TRANSVAGINAL US OBSTETRIC: CPT | Performed by: OBSTETRICS & GYNECOLOGY

## 2022-05-12 PROCEDURE — 99212 OFFICE O/P EST SF 10 MIN: CPT | Performed by: OBSTETRICS & GYNECOLOGY

## 2022-05-12 PROCEDURE — 76817 TRANSVAGINAL US OBSTETRIC: CPT

## 2022-05-12 NOTE — PROGRESS NOTES
Bernardo Tapia is a 31 y.o. female,  Patient's last menstrual period was 2021. 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    Ultrasound images and findings reviewed with patient.  Low-lying placenta has resolved.  Appropriate interval growth is noted. No fetal anomalies were identified. Amniotic fluid volume is normal. The cervical length appears normal.     ASSESSMENT:      1) Parental carriers of CF  2) Low-;lying placenta has resolved  Questions answered.    Recommendations:    1) Serial U/S every 4 weeks for fetal growth and bowel assessment with Provider (echogenic bowel may be seen in fetuses with CF)      Total time spent TODAY on this encounter, including pre-visit review of separately obtained history, face-to-face interaction performing medically appropriate physical exam, patient counseling/education, interpretation of diagnostic results, care coordination and documentation was 15 minutes.      Thank you for utilizing our ultrasound and consultative services.  If I may be of any further service, please do not hesitate to contact me.    Sincerely,    Martín Richardson MD  Maternal-Fetal Medicine

## 2022-05-12 NOTE — PROGRESS NOTES
Pt reports that she is doing well and denies vaginal bleeding, cramping, contractions, LOF at this time. Reports active fetal movement. Notes that she has been dealing with allergies and getting little relief with the zyrtec she is taking. Next OB appointment is scheduled for the last week of May.     Vitals:    05/12/22 0823   BP: 131/88   Pulse: 105   Temp: 98.7 °F (37.1 °C)

## 2022-05-24 NOTE — PROGRESS NOTES
OB follow up     Bernardo Tapia is a 31 y.o.  27w5d being seen today for her obstetrical visit.  Patient reports no bleeding, no contractions and no leaking. Fetal movement: normal.  Patient complains of new onset itching without a rash.  She has tried lotion and over-the-counter medicines without relief.    Review of Systems  No bleeding, No cramping/contractions     /82   Wt 80.7 kg (178 lb)   LMP 2021   BMI 31.53 kg/m²     FHT: present BPM   Uterine Size: 23 cm       Assessment/Plan:    1) 31 y.o.  -pregnancy at 27w5d    2)   Encounter Diagnoses   Name Primary?   • 23 weeks gestation of pregnancy Yes   • Low lying placenta nos or without hemorrhage, first trimester    • Itching    Patient has had no further bleeding from low-lying placenta.  Plan follow-up on ultrasound at a later date.  Check bile acids and CMP today.  Follow-up after testing.  2-hour GTT in future.    3) Reviewed this stage of pregnancy  4) Problem list updated     Return in about 4 weeks (around 2022) for 2 HR GTT, OB [place].      Domingo Love MD    2022  09:49 EDT

## 2022-05-27 ENCOUNTER — ROUTINE PRENATAL (OUTPATIENT)
Dept: OBSTETRICS AND GYNECOLOGY | Facility: CLINIC | Age: 32
End: 2022-05-27

## 2022-05-27 VITALS — SYSTOLIC BLOOD PRESSURE: 134 MMHG | DIASTOLIC BLOOD PRESSURE: 82 MMHG | BODY MASS INDEX: 32.77 KG/M2 | WEIGHT: 185 LBS

## 2022-05-27 DIAGNOSIS — Z36.9 ENCOUNTER FOR ANTENATAL SCREENING, UNSPECIFIED: Primary | ICD-10-CM

## 2022-05-27 LAB
GLUCOSE UR STRIP-MCNC: NEGATIVE MG/DL
PROT UR STRIP-MCNC: NEGATIVE MG/DL

## 2022-05-27 PROCEDURE — 0502F SUBSEQUENT PRENATAL CARE: CPT | Performed by: OBSTETRICS & GYNECOLOGY

## 2022-05-27 PROCEDURE — 90715 TDAP VACCINE 7 YRS/> IM: CPT | Performed by: OBSTETRICS & GYNECOLOGY

## 2022-05-27 PROCEDURE — 90471 IMMUNIZATION ADMIN: CPT | Performed by: OBSTETRICS & GYNECOLOGY

## 2022-05-27 RX ORDER — FLUTICASONE PROPIONATE 50 MCG
SPRAY, SUSPENSION (ML) NASAL
COMMUNITY
Start: 2022-05-13

## 2022-05-27 NOTE — PROGRESS NOTES
Pt here for ob check.  31 y.o. @28w1d.  No complaints.  GTT/HH today.  Desires Tdap.  Given.  I saw the patient with a face mask, gloves and eye protection  The patient herself was masked.  Social distancing was observed as appropriate. All COVID precautions observed.

## 2022-05-28 LAB
GLUCOSE 1H P 75 G GLC PO SERPL-MCNC: 163 MG/DL (ref 65–179)
GLUCOSE 2H P 75 G GLC PO SERPL-MCNC: 153 MG/DL (ref 65–152)
GLUCOSE P FAST SERPL-MCNC: 83 MG/DL (ref 65–91)
HCT VFR BLD AUTO: 39.1 % (ref 34–46.6)
HGB BLD-MCNC: 12.9 G/DL (ref 11.1–15.9)

## 2022-05-31 DIAGNOSIS — O24.410 GDM (GESTATIONAL DIABETES MELLITUS), CLASS A1: Primary | ICD-10-CM

## 2022-05-31 RX ORDER — BLOOD-GLUCOSE METER
1 KIT MISCELLANEOUS 4 TIMES DAILY
Qty: 1 EACH | Refills: 0 | Status: SHIPPED | OUTPATIENT
Start: 2022-05-31 | End: 2022-08-31

## 2022-05-31 RX ORDER — LANCETS 30 GAUGE
1 EACH MISCELLANEOUS 4 TIMES DAILY
Qty: 120 EACH | Refills: 6 | Status: SHIPPED | OUTPATIENT
Start: 2022-05-31 | End: 2022-08-31

## 2022-05-31 NOTE — PROGRESS NOTES
PIP that her GTT results indicate she has gestational diabetes and the supplies (lancets, strips, glucometer) have been ordered to her pharmacy, and a referral for diabetic counseling has been ordered.  She must check her blood sugars before getting out of bed, and 2 hours after breakfast, lunch and dinner and record in her log.  She must bring the log in for us to review each visit.

## 2022-06-02 ENCOUNTER — HOSPITAL ENCOUNTER (OUTPATIENT)
Dept: DIABETES SERVICES | Facility: HOSPITAL | Age: 32
Discharge: HOME | End: 2022-06-02
Admitting: OBSTETRICS & GYNECOLOGY

## 2022-06-02 PROCEDURE — G0108 DIAB MANAGE TRN  PER INDIV: HCPCS

## 2022-06-02 NOTE — NURSING NOTE
Met with 32 y/o in office for GDM ed. Pt has started BG checks. Review w/pt when to check BGs and BG targets/goals for pregnancy. Discuss meal planning for 3 small meals. Advise pt to bring BG log (provided) to each upcoming OB appt. Pt v.u of all. Thank you for this education referral.

## 2022-06-13 ENCOUNTER — ROUTINE PRENATAL (OUTPATIENT)
Dept: OBSTETRICS AND GYNECOLOGY | Facility: CLINIC | Age: 32
End: 2022-06-13

## 2022-06-13 VITALS — DIASTOLIC BLOOD PRESSURE: 88 MMHG | WEIGHT: 186.6 LBS | SYSTOLIC BLOOD PRESSURE: 128 MMHG | BODY MASS INDEX: 33.05 KG/M2

## 2022-06-13 DIAGNOSIS — O24.410 GDM (GESTATIONAL DIABETES MELLITUS), CLASS A1: ICD-10-CM

## 2022-06-13 DIAGNOSIS — Z34.93 PRENATAL CARE IN THIRD TRIMESTER: Primary | ICD-10-CM

## 2022-06-13 DIAGNOSIS — Z14.1 CYSTIC FIBROSIS CARRIER: ICD-10-CM

## 2022-06-13 LAB
GLUCOSE UR STRIP-MCNC: NEGATIVE MG/DL
PROT UR STRIP-MCNC: NEGATIVE MG/DL

## 2022-06-13 PROCEDURE — 0502F SUBSEQUENT PRENATAL CARE: CPT | Performed by: NURSE PRACTITIONER

## 2022-06-13 RX ORDER — BLOOD-GLUCOSE METER
EACH MISCELLANEOUS
COMMUNITY
Start: 2022-05-31 | End: 2022-08-31

## 2022-06-13 NOTE — PROGRESS NOTES
"OB follow up > 20 weeks      Chief Complaint   Patient presents with   • Routine Prenatal Visit       Brenardo Tapia is a 31 y.o.  30w4d being seen today for her obstetrical visit.  Patient reports no complaints. Taking prenatal vitamins: Yes. Reports good fetal movement but reports movement varies. States \"somedays I think the baby is lazy.\"        Review of Systems  Genitourinary: Negative for contractions, cramping, vaginal bleeding, or SROM.   Fetal movement: normal  Allergies   Allergen Reactions   • Sulfa Antibiotics Hives and Nausea And Vomiting        /88   Wt 84.6 kg (186 lb 9.6 oz)   LMP 2021   BMI 33.05 kg/m²     FHT: 14Os BPM   Uterine Size: size equals dates       Assessment    1) pregnancy at 30w4d     2) Low lying placenta- Resolved. Placenta post and 2.6cm away from cervical os on 22.     3) GDMA1- Disc GDM at length with patient. Has completed DM education. Disc goals with BS. Disc risk associated with uncontrolled BS in pregnancy. BS log shows good control. Has random elevated readings but very sporadic. Growth 39% on 22 with MFM. Needs growth US.     4) S/P flu vaccine     5) Covid vaccine- S/P Vaccine x 2 and booster x 2.     6) S/P Tdap vaccine     7) CF carrier- FOB CF carrier. Declines amnio.     8) Itching- Resolved. Normal CMP and Bile acids.     9) Peds- Dr. Buchanan. Female. Bottle. Considering epidural.     10) Fetal movement- Info provided on kick counts     Plan    Continue prenatal vitamins  Reviewed this stage of pregnancy  Problem list updated   Follow up ASAP for growth US and in 2 weeks    Rose Waldrop, REZA  2022  14:09 EDT  "

## 2022-06-30 ENCOUNTER — ROUTINE PRENATAL (OUTPATIENT)
Dept: OBSTETRICS AND GYNECOLOGY | Facility: CLINIC | Age: 32
End: 2022-06-30

## 2022-06-30 VITALS — DIASTOLIC BLOOD PRESSURE: 74 MMHG | BODY MASS INDEX: 32.24 KG/M2 | SYSTOLIC BLOOD PRESSURE: 122 MMHG | WEIGHT: 182 LBS

## 2022-06-30 DIAGNOSIS — Z34.93 PRENATAL CARE IN THIRD TRIMESTER: Primary | ICD-10-CM

## 2022-06-30 LAB
GLUCOSE UR STRIP-MCNC: NEGATIVE MG/DL
PROT UR STRIP-MCNC: NEGATIVE MG/DL

## 2022-06-30 PROCEDURE — 0502F SUBSEQUENT PRENATAL CARE: CPT | Performed by: NURSE PRACTITIONER

## 2022-07-15 ENCOUNTER — ROUTINE PRENATAL (OUTPATIENT)
Dept: OBSTETRICS AND GYNECOLOGY | Facility: CLINIC | Age: 32
End: 2022-07-15

## 2022-07-15 VITALS — BODY MASS INDEX: 32.2 KG/M2 | SYSTOLIC BLOOD PRESSURE: 124 MMHG | WEIGHT: 181.8 LBS | DIASTOLIC BLOOD PRESSURE: 72 MMHG

## 2022-07-15 DIAGNOSIS — Z3A.35 35 WEEKS GESTATION OF PREGNANCY: ICD-10-CM

## 2022-07-15 DIAGNOSIS — O24.410 GDM (GESTATIONAL DIABETES MELLITUS), CLASS A1: Primary | ICD-10-CM

## 2022-07-15 LAB
GLUCOSE UR STRIP-MCNC: NEGATIVE MG/DL
PROT UR STRIP-MCNC: NEGATIVE MG/DL

## 2022-07-15 PROCEDURE — 0502F SUBSEQUENT PRENATAL CARE: CPT | Performed by: NURSE PRACTITIONER

## 2022-07-15 NOTE — PROGRESS NOTES
OB follow up > 20 weeks      Chief Complaint   Patient presents with   • Routine Prenatal Visit       Bernardo Tapia is a 31 y.o.  35w1d being seen today for her obstetrical visit.  Patient reports no complaints. Taking prenatal vitamins: Yes.  She had a growth US. She has her BS log to review.     Review of Systems  Genitourinary: Negative for contractions, cramping, vaginal bleeding, or SROM.   Fetal movement: normal  Allergies   Allergen Reactions   • Sulfa Antibiotics Hives and Nausea And Vomiting        /72   Wt 82.5 kg (181 lb 12.8 oz)   LMP 2021   BMI 32.20 kg/m²     FHT: 140s   BPM   Uterine Size: Growth 37%        Assessment    1) pregnancy at 35w1d: US IMP: IMP: Growth 37%. EFW 5.8#. Srinivas 18cm. No echogenic bowel noted. VTX. Preliminary US results rev'd by REZA Wagner    2) Low lying placenta- Resolved. Placenta anterior, right lateral posterior and fundal today on US.     3) GDMA1- BS log shows good control with diet. Growth today is 37% compared to growth 44 % on 22.     4) S/P flu vaccine     5) Covid vaccine- S/P Vaccine x 2 and booster     6) S/P Tdap vaccine     7) CF carrier- FOB CF carrier. Declines amnio. Echogenic bowel not seen on US today.     8) Itching- Resolved. Normal CMP and Bile acids.     9) [facility]- Dr. Buchanan. Female. Bottle. Considering epidural.       Plan    Continue prenatal vitamins  Reviewed this stage of pregnancy  Problem list updated   RTO 1 weeks for OB internal and GBS     REZA Wagner  7/15/2022  11:03 EDT

## 2022-07-20 ENCOUNTER — ROUTINE PRENATAL (OUTPATIENT)
Dept: OBSTETRICS AND GYNECOLOGY | Facility: CLINIC | Age: 32
End: 2022-07-20

## 2022-07-20 VITALS — DIASTOLIC BLOOD PRESSURE: 70 MMHG | WEIGHT: 182 LBS | BODY MASS INDEX: 32.24 KG/M2 | SYSTOLIC BLOOD PRESSURE: 122 MMHG

## 2022-07-20 DIAGNOSIS — Z36.85 ANTENATAL SCREENING FOR STREPTOCOCCUS B: ICD-10-CM

## 2022-07-20 DIAGNOSIS — Z3A.35 35 WEEKS GESTATION OF PREGNANCY: Primary | ICD-10-CM

## 2022-07-20 DIAGNOSIS — O24.410 GDM (GESTATIONAL DIABETES MELLITUS), CLASS A1: ICD-10-CM

## 2022-07-20 LAB
GLUCOSE UR STRIP-MCNC: NEGATIVE MG/DL
PROT UR STRIP-MCNC: NEGATIVE MG/DL

## 2022-07-20 PROCEDURE — 0502F SUBSEQUENT PRENATAL CARE: CPT | Performed by: OBSTETRICS & GYNECOLOGY

## 2022-07-20 NOTE — PROGRESS NOTES
OB follow up     Chief Complaint: [facility] FU    Bernardo Tapia is a 31 y.o.  35w6d being seen today for her obstetrical visit.  Patient reports no complaints. Fetal movement: normal. Pt presents with her BS log-A1GDM with good control.    Review of Systems  No bleeding, No cramping/contractions     /70   Wt 82.6 kg (182 lb)   LMP 2021   BMI 32.24 kg/m²     FHT:   present   Uterine Size:  34cm       SVE FT/50/-3    Assessment/Plan: Low risk pregnancy    1) pregnancy at 35w6d: Check GBBS     2) Low lying placenta- Resolved. Placenta anterior, right lateral posterior and fundal       3) GDMA1- BS log shows good control with diet. Last growth US 37% at 35 weeks.     4) S/P flu vaccine      5) Covid vaccine- S/P Vaccine x 2 and booster      6) S/P Tdap vaccine      7) CF carrier- FOB CF carrier. Declines amnio. Echogenic bowel not seen on last US.        Reviewed this stage of pregnancy  Problem list updated   Return in about 1 week (around 2022) for OB Follow up.      Christal Chaudhari DO    2022  10:37 EDT

## 2022-07-24 LAB — B-HEM STREP SPEC QL CULT: NEGATIVE

## 2022-07-28 ENCOUNTER — ROUTINE PRENATAL (OUTPATIENT)
Dept: OBSTETRICS AND GYNECOLOGY | Facility: CLINIC | Age: 32
End: 2022-07-28

## 2022-07-28 VITALS — WEIGHT: 184 LBS | DIASTOLIC BLOOD PRESSURE: 88 MMHG | SYSTOLIC BLOOD PRESSURE: 132 MMHG | BODY MASS INDEX: 32.59 KG/M2

## 2022-07-28 DIAGNOSIS — O24.410 GDM (GESTATIONAL DIABETES MELLITUS), CLASS A1: ICD-10-CM

## 2022-07-28 DIAGNOSIS — Z34.93 PRENATAL CARE IN THIRD TRIMESTER: Primary | ICD-10-CM

## 2022-07-28 LAB
GLUCOSE UR STRIP-MCNC: NEGATIVE MG/DL
PROT UR STRIP-MCNC: NEGATIVE MG/DL

## 2022-07-28 PROCEDURE — 0502F SUBSEQUENT PRENATAL CARE: CPT | Performed by: OBSTETRICS & GYNECOLOGY

## 2022-07-31 PROBLEM — Z03.72 SUSPECTED PROBLEM WITH PLACENTA NOT FOUND: Status: RESOLVED | Noted: 2022-05-12 | Resolved: 2022-07-31

## 2022-08-03 ENCOUNTER — ROUTINE PRENATAL (OUTPATIENT)
Dept: OBSTETRICS AND GYNECOLOGY | Facility: CLINIC | Age: 32
End: 2022-08-03

## 2022-08-03 VITALS — SYSTOLIC BLOOD PRESSURE: 118 MMHG | BODY MASS INDEX: 32.45 KG/M2 | DIASTOLIC BLOOD PRESSURE: 86 MMHG | WEIGHT: 183.2 LBS

## 2022-08-03 DIAGNOSIS — Z34.90 PREGNANCY, UNSPECIFIED GESTATIONAL AGE: ICD-10-CM

## 2022-08-03 DIAGNOSIS — O24.410 GDM (GESTATIONAL DIABETES MELLITUS), CLASS A1: ICD-10-CM

## 2022-08-03 DIAGNOSIS — O44.41: Primary | ICD-10-CM

## 2022-08-03 DIAGNOSIS — Z14.1 CYSTIC FIBROSIS CARRIER: ICD-10-CM

## 2022-08-03 LAB
GLUCOSE UR STRIP-MCNC: NEGATIVE MG/DL
PROT UR STRIP-MCNC: NEGATIVE MG/DL

## 2022-08-03 PROCEDURE — 0502F SUBSEQUENT PRENATAL CARE: CPT | Performed by: OBSTETRICS & GYNECOLOGY

## 2022-08-03 NOTE — PROGRESS NOTES
OB follow up      Bernardo Tapia is a 31 y.o.  37w6d being seen today for her obstetrical visit.  Patient reports no complaints. Fetal movement: normal. Her blood sugars are all normal.     Review of Systems  No bleeding, No cramping/contractions     /86   Wt 83.1 kg (183 lb 3.2 oz)   LMP 2021   BMI 32.45 kg/m²     FHT: 142 BPM   Uterine Size: 36  cm     50 %, 1 cm, - 3 station  Assessment/Plan:    1) 31 y.o.  -pregnancy at 37w6d- GBS negative.      2) Low lying placenta- Resolved. Placenta anterior, right lateral posterior and fundal       3) GDMA1- BS log shows good control with diet. Last growth US 37% at 35 weeks. Repeat growth US next week.      4) S/P flu vaccine      5) Covid vaccine- S/P Vaccine x 2 and booster      6) S/P Tdap vaccine      7) CF carrier- FOB CF carrier. Declines amnio. Echogenic bowel not seen on last US.    8) Peds Dr Buchanan, female infant, bottle, OCPS    9)Reviewed this stage of pregnancy    10)Problem list updated     11) RTO 1 week OB int and US growth ( GDMA1)      Bouchra Monteiro MD    8/3/2022  16:09 EDT

## 2022-08-11 ENCOUNTER — ROUTINE PRENATAL (OUTPATIENT)
Dept: OBSTETRICS AND GYNECOLOGY | Facility: CLINIC | Age: 32
End: 2022-08-11

## 2022-08-11 VITALS — WEIGHT: 184.6 LBS | SYSTOLIC BLOOD PRESSURE: 118 MMHG | BODY MASS INDEX: 32.7 KG/M2 | DIASTOLIC BLOOD PRESSURE: 82 MMHG

## 2022-08-11 DIAGNOSIS — O24.410 GDM (GESTATIONAL DIABETES MELLITUS), CLASS A1: ICD-10-CM

## 2022-08-11 DIAGNOSIS — Z3A.39 39 WEEKS GESTATION OF PREGNANCY: Primary | ICD-10-CM

## 2022-08-11 LAB
GLUCOSE UR STRIP-MCNC: NEGATIVE MG/DL
PROT UR STRIP-MCNC: NEGATIVE MG/DL

## 2022-08-11 PROCEDURE — 0502F SUBSEQUENT PRENATAL CARE: CPT | Performed by: OBSTETRICS & GYNECOLOGY

## 2022-08-11 NOTE — PROGRESS NOTES
OB follow up     Bernardo Tapia is a 31 y.o.  39w0d being seen today for her obstetrical visit.  Patient reports no bleeding, no contractions and no leaking. Fetal movement: normal.  Prenatal care complicated by A1 diabetes mellitus.    Review of Systems  No bleeding, No cramping/contractions     /82   Wt 83.7 kg (184 lb 9.6 oz)   LMP 2021   BMI 32.70 kg/m²     FHT: present BPM   Uterine Size: 38 cm   No blood sugar log to review but reports normal values.  Ultrasound performed shows live viable rodriguez female fetus in the vertex position.  Growth is in the 56 percentile.  ALEC is normal at 15.6 cm.  Vertex position.    Assessment/Plan:    1) 31 y.o.  -pregnancy at 39w0d    2)   Encounter Diagnoses   Name Primary?   • 39 weeks gestation of pregnancy Yes   • GDM (gestational diabetes mellitus), class A1    Ultrasound is reassuring.  Normal growth.  Recommend delivery.  Patient wants to wait till 40 weeks next Thursday.  Induction orders filled out.    3) Reviewed this stage of pregnancy  4) Problem list updated     Return in about 1 week (around 2022) for IOL.      Domingo Love MD    2022  14:51 EDT

## 2022-08-18 ENCOUNTER — HOSPITAL ENCOUNTER (INPATIENT)
Facility: HOSPITAL | Age: 32
LOS: 3 days | Discharge: HOME | End: 2022-08-21
Attending: OBSTETRICS & GYNECOLOGY | Admitting: OBSTETRICS & GYNECOLOGY

## 2022-08-18 ENCOUNTER — HOSPITAL ENCOUNTER (OUTPATIENT)
Dept: LABOR AND DELIVERY | Facility: HOSPITAL | Age: 32
Discharge: HOME | End: 2022-08-18

## 2022-08-18 DIAGNOSIS — Z98.891 S/P CESAREAN SECTION: Primary | ICD-10-CM

## 2022-08-18 LAB
ABO GROUP BLD: NORMAL
ABO GROUP BLD: NORMAL
AMPHET+METHAMPHET UR QL: NEGATIVE
AMPHETAMINES UR QL: NEGATIVE
BACTERIA UR QL AUTO: ABNORMAL /HPF
BARBITURATES UR QL SCN: NEGATIVE
BENZODIAZ UR QL SCN: NEGATIVE
BILIRUB UR QL STRIP: NEGATIVE
BLD GP AB SCN SERPL QL: NEGATIVE
BUPRENORPHINE SERPL-MCNC: NEGATIVE NG/ML
CANNABINOIDS SERPL QL: NEGATIVE
CLARITY UR: CLEAR
COCAINE UR QL: NEGATIVE
COLOR UR: YELLOW
DEPRECATED RDW RBC AUTO: 40.9 FL (ref 37–54)
ERYTHROCYTE [DISTWIDTH] IN BLOOD BY AUTOMATED COUNT: 13.2 % (ref 12.3–15.4)
GLUCOSE BLDC GLUCOMTR-MCNC: 145 MG/DL (ref 70–130)
GLUCOSE UR STRIP-MCNC: NEGATIVE MG/DL
HCT VFR BLD AUTO: 44.3 % (ref 34–46.6)
HGB BLD-MCNC: 14.9 G/DL (ref 12–15.9)
HGB UR QL STRIP.AUTO: ABNORMAL
HYALINE CASTS UR QL AUTO: ABNORMAL /LPF
KETONES UR QL STRIP: NEGATIVE
LEUKOCYTE ESTERASE UR QL STRIP.AUTO: NEGATIVE
MCH RBC QN AUTO: 29 PG (ref 26.6–33)
MCHC RBC AUTO-ENTMCNC: 33.6 G/DL (ref 31.5–35.7)
MCV RBC AUTO: 86.4 FL (ref 79–97)
METHADONE UR QL SCN: NEGATIVE
NITRITE UR QL STRIP: NEGATIVE
OPIATES UR QL: NEGATIVE
OXYCODONE UR QL SCN: NEGATIVE
PCP UR QL SCN: NEGATIVE
PH UR STRIP.AUTO: 5.5 [PH] (ref 4.5–8)
PLATELET # BLD AUTO: 254 10*3/MM3 (ref 140–450)
PMV BLD AUTO: 9.9 FL (ref 6–12)
PROPOXYPH UR QL: NEGATIVE
PROT UR QL STRIP: NEGATIVE
RBC # BLD AUTO: 5.13 10*6/MM3 (ref 3.77–5.28)
RBC # UR STRIP: ABNORMAL /HPF
REF LAB TEST METHOD: ABNORMAL
RH BLD: POSITIVE
RH BLD: POSITIVE
SARS-COV-2 RNA PNL SPEC NAA+PROBE: NOT DETECTED
SP GR UR STRIP: 1.02 (ref 1–1.03)
SQUAMOUS #/AREA URNS HPF: ABNORMAL /HPF
T&S EXPIRATION DATE: NORMAL
TRICYCLICS UR QL SCN: NEGATIVE
UROBILINOGEN UR QL STRIP: ABNORMAL
WBC # UR STRIP: ABNORMAL /HPF
WBC NRBC COR # BLD: 8.61 10*3/MM3 (ref 3.4–10.8)

## 2022-08-18 PROCEDURE — 86901 BLOOD TYPING SEROLOGIC RH(D): CPT

## 2022-08-18 PROCEDURE — 86850 RBC ANTIBODY SCREEN: CPT | Performed by: OBSTETRICS & GYNECOLOGY

## 2022-08-18 PROCEDURE — S0260 H&P FOR SURGERY: HCPCS | Performed by: OBSTETRICS & GYNECOLOGY

## 2022-08-18 PROCEDURE — 3E0P7VZ INTRODUCTION OF HORMONE INTO FEMALE REPRODUCTIVE, VIA NATURAL OR ARTIFICIAL OPENING: ICD-10-PCS | Performed by: OBSTETRICS & GYNECOLOGY

## 2022-08-18 PROCEDURE — 86900 BLOOD TYPING SEROLOGIC ABO: CPT

## 2022-08-18 PROCEDURE — 86901 BLOOD TYPING SEROLOGIC RH(D): CPT | Performed by: OBSTETRICS & GYNECOLOGY

## 2022-08-18 PROCEDURE — 85027 COMPLETE CBC AUTOMATED: CPT | Performed by: OBSTETRICS & GYNECOLOGY

## 2022-08-18 PROCEDURE — 86900 BLOOD TYPING SEROLOGIC ABO: CPT | Performed by: OBSTETRICS & GYNECOLOGY

## 2022-08-18 PROCEDURE — 80306 DRUG TEST PRSMV INSTRMNT: CPT | Performed by: OBSTETRICS & GYNECOLOGY

## 2022-08-18 PROCEDURE — 87635 SARS-COV-2 COVID-19 AMP PRB: CPT | Performed by: OBSTETRICS & GYNECOLOGY

## 2022-08-18 PROCEDURE — 81001 URINALYSIS AUTO W/SCOPE: CPT | Performed by: OBSTETRICS & GYNECOLOGY

## 2022-08-18 PROCEDURE — 82962 GLUCOSE BLOOD TEST: CPT

## 2022-08-18 RX ORDER — SODIUM CHLORIDE, SODIUM LACTATE, POTASSIUM CHLORIDE, CALCIUM CHLORIDE 600; 310; 30; 20 MG/100ML; MG/100ML; MG/100ML; MG/100ML
125 INJECTION, SOLUTION INTRAVENOUS CONTINUOUS
Status: DISCONTINUED | OUTPATIENT
Start: 2022-08-18 | End: 2022-08-21 | Stop reason: HOSPADM

## 2022-08-18 RX ORDER — OXYTOCIN/0.9 % SODIUM CHLORIDE 30/500 ML
2-20 PLASTIC BAG, INJECTION (ML) INTRAVENOUS
Status: DISCONTINUED | OUTPATIENT
Start: 2022-08-18 | End: 2022-08-18

## 2022-08-18 RX ORDER — LIDOCAINE HYDROCHLORIDE 10 MG/ML
5 INJECTION, SOLUTION EPIDURAL; INFILTRATION; INTRACAUDAL; PERINEURAL AS NEEDED
Status: DISCONTINUED | OUTPATIENT
Start: 2022-08-18 | End: 2022-08-19

## 2022-08-18 RX ORDER — SODIUM CHLORIDE 0.9 % (FLUSH) 0.9 %
10 SYRINGE (ML) INJECTION EVERY 12 HOURS SCHEDULED
Status: DISCONTINUED | OUTPATIENT
Start: 2022-08-18 | End: 2022-08-19

## 2022-08-18 RX ORDER — SODIUM CHLORIDE 0.9 % (FLUSH) 0.9 %
10 SYRINGE (ML) INJECTION AS NEEDED
Status: DISCONTINUED | OUTPATIENT
Start: 2022-08-18 | End: 2022-08-19

## 2022-08-18 RX ADMIN — Medication 10 ML: at 19:44

## 2022-08-18 RX ADMIN — SODIUM CHLORIDE, POTASSIUM CHLORIDE, SODIUM LACTATE AND CALCIUM CHLORIDE 125 ML/HR: 600; 310; 30; 20 INJECTION, SOLUTION INTRAVENOUS at 05:45

## 2022-08-18 RX ADMIN — DINOPROSTONE 10 MG: 10 INSERT VAGINAL at 19:44

## 2022-08-18 RX ADMIN — SODIUM CHLORIDE, POTASSIUM CHLORIDE, SODIUM LACTATE AND CALCIUM CHLORIDE 125 ML/HR: 600; 310; 30; 20 INJECTION, SOLUTION INTRAVENOUS at 13:16

## 2022-08-18 RX ADMIN — OXYTOCIN-SODIUM CHLORIDE 0.9% IV SOLN 30 UNIT/500ML 2 MILLI-UNITS/MIN: 30-0.9/5 SOLUTION at 06:27

## 2022-08-18 NOTE — NURSING NOTE
Dr Martinez at bedside for evaluation.  POC discussed with patient.  Patient and MD agreed that best course is to stop pitocin and do cervidil for cervical ripening overnight.  See orders.

## 2022-08-18 NOTE — PLAN OF CARE
Goal Outcome Evaluation:  Plan of Care Reviewed With: patient, spouse        Progress: improving  Outcome Evaluation: labor induction with pitocin unsuccessful, cervidil to be placed overnight, vss, no c/o pain

## 2022-08-18 NOTE — H&P
OB HISTORY AND PHYSICAL      Patient Care Team:  Raisa Buchanan MD as PCP - General (Internal Medicine)  Christal Chaudhari DO as Consulting Physician (Obstetrics and Gynecology)    Chief complaint: <principal problem not specified>    31 y.o.  . Patient's last menstrual period was 2021. = 40w0d     HPI: Pt admitted for IOL for term, A1DM.  Prenatal course significant for:    1. Rh pos  2. gbs neg  3. A1DM  4. +CF carrier, both parents. MFM rec: Serial U/S every 4 weeks for fetal growth and bowel assessment with Provider (echogenic bowel may be seen in fetuses with CF).  USs have been otherwise wnl.      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 Ref to genetics.    • Pregnancy   • Hereditary disease in family possibly affecting fetus   • Low lying placenta nos or without hemorrhage, first trimester: resolved.    • GDM (gestational diabetes mellitus), class A1       PMHx:   Past Medical History:   Diagnosis Date   • ADHD    • Anxiety    • Asthma, exercise induced     inhaler as needed with exercise   • Cystic fibrosis carrier    • GDM (gestational diabetes mellitus), class A1 2022   • GERD (gastroesophageal reflux disease)    • Seasonal allergies        PSHx:   Past Surgical History:   Procedure Laterality Date   • ORTHOPEDIC SURGERY      cyst removal on wrist   • WISDOM TOOTH EXTRACTION         Social Hx:   Social History     Socioeconomic History   • Marital status:      Spouse name: Rickey   Tobacco Use   • Smoking status: Never Smoker   • Smokeless tobacco: Never Used   Vaping Use   • Vaping Use: Never used   Substance and Sexual Activity   • Alcohol use: Not Currently   • Drug use: Never   • Sexual activity: Yes     Partners: Male     Birth control/protection: None     Comment: currently pregnant       FHx:   Family History   Problem Relation Age of Onset   • Hypertension Mother    •  Cystic fibrosis Mother    • Breast cancer Mother         stage 0 diagnosed >50   • No Known Problems Father        Debilities/Disabilities Identified: None    Emotional Behavior: Appropriate    PGyn Hx:  otherwise neg    POBHx:   OB History    Para Term  AB Living   2 0 0 0 1 0   SAB IAB Ectopic Molar Multiple Live Births   1 0 0 0 0 0      # Outcome Date GA Lbr Tray/2nd Weight Sex Delivery Anes PTL Lv   2 Current            1 SAB 10/2021 6w0d    SAB          Allergies: Sulfa antibiotics    Medications:   Medications Prior to Admission   Medication Sig Dispense Refill Last Dose   • Calcium Carb-Cholecalciferol (CALCIUM 500 + D3 PO)    2022 at Unknown time   • cetirizine (zyrTEC) 10 MG tablet Take 10 mg by mouth Daily.   2022 at Unknown time   • doxylamine (UNISOM) 25 MG tablet Take 25 mg by mouth At Night As Needed for Sleep.   2022 at Unknown time   • fluticasone (FLONASE) 50 MCG/ACT nasal spray    2022 at Unknown time   • ipratropium (ATROVENT) 0.06 % nasal spray 2 sprays into the nostril(s) as directed by provider 4 (Four) Times a Day.   2022 at Unknown time   • Misc Natural Products (FIBER SUPREME PO) Take  by mouth.   2022 at Unknown time   • prenatal vitamin (prenatal, CLASSIC, vitamin) tablet Take 1 tablet by mouth Daily.   2022 at Unknown time   • Blood Glucose Monitoring Suppl (Accu-Chek Guide) w/Device kit USE TO CHECK BLOOD GLUCOSE 4 TIMES DAILY      • glucose blood test strip Use as instructed 120 each 6    • glucose monitor monitoring kit 1 each 4 (Four) Times a Day for 180 days. 1 each 0    • Lancets misc 1 stick 4 (Four) Times a Day for 180 days. 4 times daily 120 each 6                               Current Facility-Administered Medications:   •  lactated ringers bolus 1,000 mL, 1,000 mL, Intravenous, Once PRN, Christal Chaudhari, DO  •  lactated ringers infusion, 125 mL/hr, Intravenous, Continuous, Christal Chaudhari, DO, Last Rate: 125 mL/hr at 22  "0545, 125 mL/hr at 08/18/22 0545  •  lidocaine PF 1% (XYLOCAINE) injection 5 mL, 5 mL, Intradermal, PRN, Christal Chaudhari, DO  •  oxytocin (PITOCIN) 30 units in 0.9% sodium chloride 500 mL (premix), 2-20 chase-units/min, Intravenous, Titrated, Christal Chaudhari, DO, Last Rate: 6 mL/hr at 08/18/22 0730, 6 chase-units/min at 08/18/22 0730  •  sodium chloride 0.9 % flush 10 mL, 10 mL, Intravenous, Q12H, Christal Chaudhari,   •  sodium chloride 0.9 % flush 10 mL, 10 mL, Intravenous, PRN, Christal Chaudhari, DO    Review of Systems   Constitutional: Negative.    HENT: Negative.    Eyes: Negative.    Respiratory: Negative.    Cardiovascular: Negative.    Gastrointestinal: Negative.    Endocrine: Negative.    Genitourinary: Negative.    Musculoskeletal: Negative.    Skin: Negative.    Allergic/Immunologic: Negative.    Neurological: Negative.    Hematological: Negative.    Psychiatric/Behavioral: Negative.        Vital Signs  /84   Pulse 95   Temp 98.7 °F (37.1 °C) (Oral)   Resp 18   Ht 160 cm (63\")   LMP 11/11/2021   SpO2 98%   Breastfeeding No   BMI 32.70 kg/m²     Physical Exam  Vitals and nursing note reviewed.   Constitutional:       Appearance: She is well-developed.   HENT:      Head: Normocephalic and atraumatic.   Cardiovascular:      Rate and Rhythm: Normal rate.   Pulmonary:      Effort: Pulmonary effort is normal.   Abdominal:      General: There is no distension.      Palpations: Abdomen is soft. There is no mass.      Tenderness: There is no abdominal tenderness. There is no guarding.   Genitourinary:     Vagina: No vaginal discharge.   Musculoskeletal:         General: No tenderness or deformity. Normal range of motion.      Cervical back: Normal range of motion.   Skin:     General: Skin is warm and dry.      Coloration: Skin is not pale.      Findings: No erythema or rash.   Neurological:      Mental Status: She is alert and oriented to person, place, and time.   Psychiatric:         Behavior: " Behavior normal.         Thought Content: Thought content normal.         Judgment: Judgment normal.             Presentation: cephalic   Cervix: Exam by:staff     Dilation: Cervical Dilation (cm): 1-2   Effacement: Cervical Effacement: 40-50%   Station: Fetal Station: 1-->-2     Fetal Heart Rate Assessment   Method: Fetal HR Assessment Method: external   Beats/min: Fetal HR (beats/min): 145   Baseline: Fetal HR Baseline: normal range   Variability: Fetal HR Variability: moderate (amplitude range 6 to 25 bpm)   Accels: Fetal HR Accelerations: greater than/equal to 15 bpm, lasting at least 15 seconds   Decels: Fetal HR Decelerations: absent   Tracing Category:       Uterine Assessment   Method: Method: external tocotransducer   Frequency (min): Contraction Frequency (Minutes): 3-5   Ctx Count in 10 min:     Duration:     Intensity: Contraction Intensity: mild by palpation   Intensity by IUPC:     Resting Tone: Uterine Resting Tone: soft by palpation   Resting Tone by IUPC:     [place] Units:       Laboratory Results: hgb = 14.9  Radiology Review: no  Other Studies: no      Assessment:    Patient Active Problem List   Diagnosis   • H/O one miscarriage   • 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 Ref to genetics.    • Pregnancy   • Hereditary disease in family possibly affecting fetus   • Low lying placenta nos or without hemorrhage, first trimester: resolved.    • GDM (gestational diabetes mellitus), class A1       1.  Intrauterine pregnancy at 40w0d gestation with reactive fetal status.    2.  induction of labor  for term, A1DM  with unfavorable cervix  3.  Obstetrical history significant for as above.  4.  GBS status:   Strep Gp B Culture   Date Value Ref Range Status   2022 Negative Negative Final     Comment:     Centers for Disease Control and Prevention (CDC) and American Congress  of Obstetricians and Gynecologists (ACOG) guidelines for prevention of   group B streptococcal (GBS) disease  specify co-collection of  a vaginal and rectal swab specimen to maximize sensitivity of GBS  detection. Per the CDC and ACOG, swabbing both the lower vagina and  rectum substantially increases the yield of detection compared with  sampling the vagina alone.  Penicillin G, ampicillin, or cefazolin are indicated for intrapartum  prophylaxis of  GBS colonization. Reflex susceptibility  testing should be performed prior to use of clindamycin only on GBS  isolates from penicillin-allergic women who are considered a high risk  for anaphylaxis. Treatment with vancomycin without additional testing  is warranted if resistance to clindamycin is noted.         Plan:  1. Vaginal anticipated  2. Plan of care has been reviewed with patient and partner  3.  Risks, benefits of treatment plan have been discussed.  4.  All questions have been answered.  5.  I counseled pt and her significant other/family member ref: indications for a  delivery including but not limited to 1. Maternal intolerance to labor, 2. Fetal intolerance to labor or fetal distress, 3. Failed induction, CPD or dystocia.  Pt and her family verbalized their understanding.         I discussed the patient's findings and my recommendations with patient and family.     Alexis Martinez MD  22  07:46 EDT

## 2022-08-18 NOTE — PLAN OF CARE
Problem: Adult Inpatient Plan of Care  Goal: Plan of Care Review  Outcome: Ongoing, Progressing  Flowsheets (Taken 8/18/2022 0712)  Progress: improving  Plan of Care Reviewed With:   patient   spouse  Outcome Evaluation: Pt arrived to [facility] for scheduled IOL. SVE 1.5/50%/-2. Pitocin started at 2mu. LR infusing at 125 cc/hr. Baby girl.   Goal Outcome Evaluation:  Plan of Care Reviewed With: patient, spouse        Progress: improving  Outcome Evaluation: Pt arrived to [facility] for scheduled IOL. SVE 1.5/50%/-2. Pitocin started at 2mu. LR infusing at 125 cc/hr. Baby girl.

## 2022-08-18 NOTE — PROGRESS NOTES
"PROGRESS NOTE    S:  Pt without complaints.  No pain    O:  /86   Pulse 107   Temp 97.7 °F (36.5 °C) (Oral)   Resp 18   Ht 160 cm (63\")   LMP 11/11/2021   SpO2 98%   Breastfeeding No   BMI 32.70 kg/m²     FHTs R    Cxts irregular on 20 miu pit/min    Cx: 50/1/-1    A:  40w0d pregnancy for IOL for term; unfavorable cervix.    P:  Dc pit.  Cervidil through the night      Alexis Martinez MD  16:12 EDT  08/18/22    "

## 2022-08-19 ENCOUNTER — ANESTHESIA EVENT (OUTPATIENT)
Dept: OBSTETRICS AND GYNECOLOGY | Facility: HOSPITAL | Age: 32
End: 2022-08-19

## 2022-08-19 ENCOUNTER — ANESTHESIA (OUTPATIENT)
Dept: OBSTETRICS AND GYNECOLOGY | Facility: HOSPITAL | Age: 32
End: 2022-08-19

## 2022-08-19 LAB
GLUCOSE BLDC GLUCOMTR-MCNC: 101 MG/DL (ref 70–130)
GLUCOSE BLDC GLUCOMTR-MCNC: 62 MG/DL (ref 70–130)
GLUCOSE BLDC GLUCOMTR-MCNC: 67 MG/DL (ref 70–130)
GLUCOSE BLDC GLUCOMTR-MCNC: 69 MG/DL (ref 70–130)
GLUCOSE BLDC GLUCOMTR-MCNC: 78 MG/DL (ref 70–130)
GLUCOSE BLDC GLUCOMTR-MCNC: 80 MG/DL (ref 70–130)
GLUCOSE BLDC GLUCOMTR-MCNC: 87 MG/DL (ref 70–130)

## 2022-08-19 PROCEDURE — 25010000002 AZITHROMYCIN PER 500 MG: Performed by: OBSTETRICS & GYNECOLOGY

## 2022-08-19 PROCEDURE — 3E033VJ INTRODUCTION OF OTHER HORMONE INTO PERIPHERAL VEIN, PERCUTANEOUS APPROACH: ICD-10-PCS | Performed by: OBSTETRICS & GYNECOLOGY

## 2022-08-19 PROCEDURE — C1755 CATHETER, INTRASPINAL: HCPCS | Performed by: REGISTERED NURSE

## 2022-08-19 PROCEDURE — 25010000002 DEXAMETHASONE PER 1 MG: Performed by: REGISTERED NURSE

## 2022-08-19 PROCEDURE — 94761 N-INVAS EAR/PLS OXIMETRY MLT: CPT

## 2022-08-19 PROCEDURE — 10907ZC DRAINAGE OF AMNIOTIC FLUID, THERAPEUTIC FROM PRODUCTS OF CONCEPTION, VIA NATURAL OR ARTIFICIAL OPENING: ICD-10-PCS | Performed by: OBSTETRICS & GYNECOLOGY

## 2022-08-19 PROCEDURE — 25010000002 ONDANSETRON PER 1 MG: Performed by: REGISTERED NURSE

## 2022-08-19 PROCEDURE — 25010000002 PHENYLEPHRINE 10 MG/ML SOLUTION: Performed by: REGISTERED NURSE

## 2022-08-19 PROCEDURE — S0260 H&P FOR SURGERY: HCPCS | Performed by: OBSTETRICS & GYNECOLOGY

## 2022-08-19 PROCEDURE — 82962 GLUCOSE BLOOD TEST: CPT

## 2022-08-19 PROCEDURE — 0 CEFAZOLIN SODIUM-DEXTROSE 2-3 GM-%(50ML) RECONSTITUTED SOLUTION: Performed by: OBSTETRICS & GYNECOLOGY

## 2022-08-19 PROCEDURE — 25010000002 MIDAZOLAM PER 1 MG: Performed by: REGISTERED NURSE

## 2022-08-19 PROCEDURE — 59514 CESAREAN DELIVERY ONLY: CPT | Performed by: SPECIALIST/TECHNOLOGIST, OTHER

## 2022-08-19 PROCEDURE — 25010000002 FENTANYL CITRATE (PF) 50 MCG/ML SOLUTION: Performed by: REGISTERED NURSE

## 2022-08-19 PROCEDURE — 94799 UNLISTED PULMONARY SVC/PX: CPT

## 2022-08-19 PROCEDURE — 59510 CESAREAN DELIVERY: CPT | Performed by: OBSTETRICS & GYNECOLOGY

## 2022-08-19 RX ORDER — DIPHENHYDRAMINE HYDROCHLORIDE 50 MG/ML
12.5 INJECTION INTRAMUSCULAR; INTRAVENOUS EVERY 6 HOURS PRN
Status: ACTIVE | OUTPATIENT
Start: 2022-08-19 | End: 2022-08-20

## 2022-08-19 RX ORDER — OXYTOCIN/0.9 % SODIUM CHLORIDE 30/500 ML
650 PLASTIC BAG, INJECTION (ML) INTRAVENOUS ONCE
Status: DISCONTINUED | OUTPATIENT
Start: 2022-08-19 | End: 2022-08-21 | Stop reason: HOSPADM

## 2022-08-19 RX ORDER — SCOLOPAMINE TRANSDERMAL SYSTEM 1 MG/1
PATCH, EXTENDED RELEASE TRANSDERMAL AS NEEDED
Status: DISCONTINUED | OUTPATIENT
Start: 2022-08-19 | End: 2022-08-19 | Stop reason: SURG

## 2022-08-19 RX ORDER — FENTANYL 0.2 MG/100ML-BUPIV 0.125%-NACL 0.9% EPIDURAL INJ 2/0.125 MCG/ML-%
SOLUTION INJECTION
Status: COMPLETED
Start: 2022-08-19 | End: 2022-08-19

## 2022-08-19 RX ORDER — CARBOPROST TROMETHAMINE 250 UG/ML
250 INJECTION, SOLUTION INTRAMUSCULAR
Status: DISCONTINUED | OUTPATIENT
Start: 2022-08-19 | End: 2022-08-21 | Stop reason: HOSPADM

## 2022-08-19 RX ORDER — METHYLERGONOVINE MALEATE 0.2 MG/ML
200 INJECTION INTRAVENOUS ONCE AS NEEDED
Status: DISCONTINUED | OUTPATIENT
Start: 2022-08-19 | End: 2022-08-21 | Stop reason: HOSPADM

## 2022-08-19 RX ORDER — FLUTICASONE PROPIONATE 50 MCG
1 SPRAY, SUSPENSION (ML) NASAL DAILY
Status: DISCONTINUED | OUTPATIENT
Start: 2022-08-20 | End: 2022-08-21 | Stop reason: HOSPADM

## 2022-08-19 RX ORDER — SODIUM CHLORIDE, SODIUM LACTATE, POTASSIUM CHLORIDE, CALCIUM CHLORIDE 600; 310; 30; 20 MG/100ML; MG/100ML; MG/100ML; MG/100ML
100 INJECTION, SOLUTION INTRAVENOUS CONTINUOUS
Status: DISCONTINUED | OUTPATIENT
Start: 2022-08-19 | End: 2022-08-21 | Stop reason: HOSPADM

## 2022-08-19 RX ORDER — MIDAZOLAM HYDROCHLORIDE 1 MG/ML
INJECTION INTRAMUSCULAR; INTRAVENOUS AS NEEDED
Status: DISCONTINUED | OUTPATIENT
Start: 2022-08-19 | End: 2022-08-19 | Stop reason: SURG

## 2022-08-19 RX ORDER — SODIUM CHLORIDE 9 MG/ML
INJECTION, SOLUTION INTRAVENOUS
Status: DISPENSED
Start: 2022-08-19 | End: 2022-08-20

## 2022-08-19 RX ORDER — SIMETHICONE 80 MG
80 TABLET,CHEWABLE ORAL 4 TIMES DAILY PRN
Status: DISCONTINUED | OUTPATIENT
Start: 2022-08-19 | End: 2022-08-21 | Stop reason: HOSPADM

## 2022-08-19 RX ORDER — LIDOCAINE HYDROCHLORIDE AND EPINEPHRINE 15; 5 MG/ML; UG/ML
INJECTION, SOLUTION EPIDURAL AS NEEDED
Status: DISCONTINUED | OUTPATIENT
Start: 2022-08-19 | End: 2022-08-19 | Stop reason: SURG

## 2022-08-19 RX ORDER — ONDANSETRON 2 MG/ML
INJECTION INTRAMUSCULAR; INTRAVENOUS AS NEEDED
Status: DISCONTINUED | OUTPATIENT
Start: 2022-08-19 | End: 2022-08-19 | Stop reason: SURG

## 2022-08-19 RX ORDER — CEFAZOLIN SODIUM 2 G/50ML
2 SOLUTION INTRAVENOUS EVERY 8 HOURS
Status: COMPLETED | OUTPATIENT
Start: 2022-08-19 | End: 2022-08-19

## 2022-08-19 RX ORDER — FAMOTIDINE 10 MG/ML
INJECTION, SOLUTION INTRAVENOUS
Status: COMPLETED
Start: 2022-08-19 | End: 2022-08-19

## 2022-08-19 RX ORDER — MISOPROSTOL 200 UG/1
800 TABLET ORAL ONCE AS NEEDED
Status: DISCONTINUED | OUTPATIENT
Start: 2022-08-19 | End: 2022-08-21 | Stop reason: HOSPADM

## 2022-08-19 RX ORDER — DIPHENHYDRAMINE HCL 25 MG
25 CAPSULE ORAL
Status: DISCONTINUED | OUTPATIENT
Start: 2022-08-19 | End: 2022-08-21 | Stop reason: HOSPADM

## 2022-08-19 RX ORDER — FENTANYL CITRATE 50 UG/ML
INJECTION, SOLUTION INTRAMUSCULAR; INTRAVENOUS AS NEEDED
Status: DISCONTINUED | OUTPATIENT
Start: 2022-08-19 | End: 2022-08-19 | Stop reason: SURG

## 2022-08-19 RX ORDER — ONDANSETRON 2 MG/ML
4 INJECTION INTRAMUSCULAR; INTRAVENOUS EVERY 6 HOURS PRN
Status: DISCONTINUED | OUTPATIENT
Start: 2022-08-19 | End: 2022-08-21 | Stop reason: HOSPADM

## 2022-08-19 RX ORDER — CALCIUM CARBONATE 200(500)MG
1 TABLET,CHEWABLE ORAL EVERY 4 HOURS PRN
Status: DISCONTINUED | OUTPATIENT
Start: 2022-08-19 | End: 2022-08-21 | Stop reason: HOSPADM

## 2022-08-19 RX ORDER — IPRATROPIUM BROMIDE 42 UG/1
1 SPRAY, METERED NASAL 4 TIMES DAILY
Status: DISCONTINUED | OUTPATIENT
Start: 2022-08-19 | End: 2022-08-21 | Stop reason: HOSPADM

## 2022-08-19 RX ORDER — OXYTOCIN/0.9 % SODIUM CHLORIDE 30/500 ML
2-20 PLASTIC BAG, INJECTION (ML) INTRAVENOUS
Status: DISCONTINUED | OUTPATIENT
Start: 2022-08-19 | End: 2022-08-19

## 2022-08-19 RX ORDER — IBUPROFEN 600 MG/1
600 TABLET ORAL EVERY 6 HOURS
Status: DISCONTINUED | OUTPATIENT
Start: 2022-08-20 | End: 2022-08-21 | Stop reason: HOSPADM

## 2022-08-19 RX ORDER — DEXAMETHASONE SODIUM PHOSPHATE 4 MG/ML
INJECTION, SOLUTION INTRA-ARTICULAR; INTRALESIONAL; INTRAMUSCULAR; INTRAVENOUS; SOFT TISSUE AS NEEDED
Status: DISCONTINUED | OUTPATIENT
Start: 2022-08-19 | End: 2022-08-19 | Stop reason: SURG

## 2022-08-19 RX ORDER — KETOROLAC TROMETHAMINE 30 MG/ML
15 INJECTION, SOLUTION INTRAMUSCULAR; INTRAVENOUS EVERY 6 HOURS
Status: DISPENSED | OUTPATIENT
Start: 2022-08-20 | End: 2022-08-20

## 2022-08-19 RX ORDER — OXYCODONE HYDROCHLORIDE 5 MG/1
10 TABLET ORAL EVERY 4 HOURS PRN
Status: DISCONTINUED | OUTPATIENT
Start: 2022-08-19 | End: 2022-08-21 | Stop reason: HOSPADM

## 2022-08-19 RX ORDER — FENTANYL 0.2 MG/100ML-BUPIV 0.125%-NACL 0.9% EPIDURAL INJ 2/0.125 MCG/ML-%
SOLUTION INJECTION CONTINUOUS
Status: DISCONTINUED | OUTPATIENT
Start: 2022-08-19 | End: 2022-08-19

## 2022-08-19 RX ORDER — ACETAMINOPHEN 500 MG
1000 TABLET ORAL EVERY 6 HOURS
Status: COMPLETED | OUTPATIENT
Start: 2022-08-19 | End: 2022-08-20

## 2022-08-19 RX ORDER — ONDANSETRON 4 MG/1
4 TABLET, FILM COATED ORAL EVERY 8 HOURS PRN
Status: DISCONTINUED | OUTPATIENT
Start: 2022-08-19 | End: 2022-08-21 | Stop reason: HOSPADM

## 2022-08-19 RX ORDER — ALUMINA, MAGNESIA, AND SIMETHICONE 2400; 2400; 240 MG/30ML; MG/30ML; MG/30ML
15 SUSPENSION ORAL EVERY 4 HOURS PRN
Status: DISCONTINUED | OUTPATIENT
Start: 2022-08-19 | End: 2022-08-21 | Stop reason: HOSPADM

## 2022-08-19 RX ORDER — SCOLOPAMINE TRANSDERMAL SYSTEM 1 MG/1
PATCH, EXTENDED RELEASE TRANSDERMAL
Status: COMPLETED
Start: 2022-08-19 | End: 2022-08-19

## 2022-08-19 RX ORDER — ACETAMINOPHEN 325 MG/1
650 TABLET ORAL EVERY 6 HOURS
Status: DISCONTINUED | OUTPATIENT
Start: 2022-08-21 | End: 2022-08-21 | Stop reason: HOSPADM

## 2022-08-19 RX ORDER — LIDOCAINE HYDROCHLORIDE 20 MG/ML
INJECTION, SOLUTION INFILTRATION; PERINEURAL AS NEEDED
Status: DISCONTINUED | OUTPATIENT
Start: 2022-08-19 | End: 2022-08-19 | Stop reason: SURG

## 2022-08-19 RX ORDER — OXYTOCIN/0.9 % SODIUM CHLORIDE 30/500 ML
85 PLASTIC BAG, INJECTION (ML) INTRAVENOUS ONCE
Status: COMPLETED | OUTPATIENT
Start: 2022-08-19 | End: 2022-08-19

## 2022-08-19 RX ORDER — CEFAZOLIN SODIUM 2 G/50ML
SOLUTION INTRAVENOUS
Status: COMPLETED
Start: 2022-08-19 | End: 2022-08-19

## 2022-08-19 RX ORDER — PHENYLEPHRINE HYDROCHLORIDE 10 MG/ML
INJECTION INTRAVENOUS AS NEEDED
Status: DISCONTINUED | OUTPATIENT
Start: 2022-08-19 | End: 2022-08-19 | Stop reason: SURG

## 2022-08-19 RX ORDER — AZITHROMYCIN 500 MG/1
INJECTION, POWDER, LYOPHILIZED, FOR SOLUTION INTRAVENOUS
Status: DISPENSED
Start: 2022-08-19 | End: 2022-08-20

## 2022-08-19 RX ORDER — MISOPROSTOL 200 UG/1
TABLET ORAL
Status: DISCONTINUED
Start: 2022-08-19 | End: 2022-08-19 | Stop reason: WASHOUT

## 2022-08-19 RX ORDER — OXYCODONE HYDROCHLORIDE 5 MG/1
5 TABLET ORAL EVERY 4 HOURS PRN
Status: DISCONTINUED | OUTPATIENT
Start: 2022-08-19 | End: 2022-08-21 | Stop reason: HOSPADM

## 2022-08-19 RX ORDER — PRENATAL VIT/IRON FUM/FOLIC AC 27MG-0.8MG
1 TABLET ORAL DAILY
Status: DISCONTINUED | OUTPATIENT
Start: 2022-08-20 | End: 2022-08-21 | Stop reason: HOSPADM

## 2022-08-19 RX ORDER — FAMOTIDINE 10 MG/ML
INJECTION, SOLUTION INTRAVENOUS AS NEEDED
Status: DISCONTINUED | OUTPATIENT
Start: 2022-08-19 | End: 2022-08-19 | Stop reason: SURG

## 2022-08-19 RX ORDER — SODIUM CHLORIDE, SODIUM LACTATE, POTASSIUM CHLORIDE, CALCIUM CHLORIDE 600; 310; 30; 20 MG/100ML; MG/100ML; MG/100ML; MG/100ML
125 INJECTION, SOLUTION INTRAVENOUS CONTINUOUS
Status: DISCONTINUED | OUTPATIENT
Start: 2022-08-19 | End: 2022-08-21 | Stop reason: HOSPADM

## 2022-08-19 RX ORDER — EPHEDRINE SULFATE 50 MG/ML
10 INJECTION, SOLUTION INTRAVENOUS
Status: DISCONTINUED | OUTPATIENT
Start: 2022-08-19 | End: 2022-08-19

## 2022-08-19 RX ORDER — ACETAMINOPHEN 500 MG
1000 TABLET ORAL ONCE
Status: COMPLETED | OUTPATIENT
Start: 2022-08-19 | End: 2022-08-19

## 2022-08-19 RX ORDER — DIPHENHYDRAMINE HCL 25 MG
25 CAPSULE ORAL EVERY 4 HOURS PRN
Status: DISCONTINUED | OUTPATIENT
Start: 2022-08-19 | End: 2022-08-21 | Stop reason: HOSPADM

## 2022-08-19 RX ORDER — DOCUSATE SODIUM 100 MG/1
100 CAPSULE, LIQUID FILLED ORAL 2 TIMES DAILY PRN
Status: DISCONTINUED | OUTPATIENT
Start: 2022-08-19 | End: 2022-08-21 | Stop reason: HOSPADM

## 2022-08-19 RX ADMIN — ACETAMINOPHEN 1000 MG: 500 TABLET ORAL at 06:05

## 2022-08-19 RX ADMIN — DEXAMETHASONE SODIUM PHOSPHATE 4 MG: 4 INJECTION, SOLUTION INTRAMUSCULAR; INTRAVENOUS at 20:48

## 2022-08-19 RX ADMIN — LIDOCAINE HYDROCHLORIDE 10 ML: 20 INJECTION, SOLUTION INFILTRATION; PERINEURAL at 20:54

## 2022-08-19 RX ADMIN — LIDOCAINE HYDROCHLORIDE AND EPINEPHRINE 3 ML: 15; 5 INJECTION, SOLUTION EPIDURAL at 13:46

## 2022-08-19 RX ADMIN — FAMOTIDINE 20 MG: 10 INJECTION, SOLUTION INTRAVENOUS at 20:49

## 2022-08-19 RX ADMIN — AZITHROMYCIN MONOHYDRATE 500 MG: 500 INJECTION, POWDER, LYOPHILIZED, FOR SOLUTION INTRAVENOUS at 20:12

## 2022-08-19 RX ADMIN — SODIUM CHLORIDE, POTASSIUM CHLORIDE, SODIUM LACTATE AND CALCIUM CHLORIDE 125 ML/HR: 600; 310; 30; 20 INJECTION, SOLUTION INTRAVENOUS at 09:30

## 2022-08-19 RX ADMIN — CEFAZOLIN SODIUM 2 G: 2 SOLUTION INTRAVENOUS at 13:41

## 2022-08-19 RX ADMIN — OXYTOCIN-SODIUM CHLORIDE 0.9% IV SOLN 30 UNIT/500ML 400 ML: 30-0.9/5 SOLUTION at 21:34

## 2022-08-19 RX ADMIN — Medication 10 ML/HR: at 13:54

## 2022-08-19 RX ADMIN — LIDOCAINE HYDROCHLORIDE AND EPINEPHRINE 2 ML: 15; 5 INJECTION, SOLUTION EPIDURAL at 13:51

## 2022-08-19 RX ADMIN — SCOPALAMINE 1 PATCH: 1 PATCH, EXTENDED RELEASE TRANSDERMAL at 20:49

## 2022-08-19 RX ADMIN — SODIUM CHLORIDE, POTASSIUM CHLORIDE, SODIUM LACTATE AND CALCIUM CHLORIDE: 600; 310; 30; 20 INJECTION, SOLUTION INTRAVENOUS at 20:48

## 2022-08-19 RX ADMIN — PHENYLEPHRINE HYDROCHLORIDE 100 MCG: 10 INJECTION INTRAVENOUS at 21:09

## 2022-08-19 RX ADMIN — ONDANSETRON 4 MG: 2 INJECTION INTRAMUSCULAR; INTRAVENOUS at 20:48

## 2022-08-19 RX ADMIN — OXYTOCIN-SODIUM CHLORIDE 0.9% IV SOLN 30 UNIT/500ML 85 ML/HR: 30-0.9/5 SOLUTION at 21:45

## 2022-08-19 RX ADMIN — SODIUM CHLORIDE, POTASSIUM CHLORIDE, SODIUM LACTATE AND CALCIUM CHLORIDE 125 ML/HR: 600; 310; 30; 20 INJECTION, SOLUTION INTRAVENOUS at 19:51

## 2022-08-19 RX ADMIN — LIDOCAINE HYDROCHLORIDE 10 ML: 20 INJECTION, SOLUTION INFILTRATION; PERINEURAL at 20:48

## 2022-08-19 RX ADMIN — OXYTOCIN-SODIUM CHLORIDE 0.9% IV SOLN 30 UNIT/500ML 2 MILLI-UNITS/MIN: 30-0.9/5 SOLUTION at 09:30

## 2022-08-19 RX ADMIN — OXYTOCIN-SODIUM CHLORIDE 0.9% IV SOLN 30 UNIT/500ML 2 MILLI-UNITS/MIN: 30-0.9/5 SOLUTION at 18:33

## 2022-08-19 RX ADMIN — MIDAZOLAM 0.25 MG: 1 INJECTION INTRAMUSCULAR; INTRAVENOUS at 21:28

## 2022-08-19 RX ADMIN — SODIUM CHLORIDE, POTASSIUM CHLORIDE, SODIUM LACTATE AND CALCIUM CHLORIDE 1000 ML: 600; 310; 30; 20 INJECTION, SOLUTION INTRAVENOUS at 13:22

## 2022-08-19 RX ADMIN — FENTANYL CITRATE 25 MCG: 50 INJECTION INTRAMUSCULAR; INTRAVENOUS at 20:48

## 2022-08-19 NOTE — PROGRESS NOTES
Pt seen and examined. Cervix still very firm and 3-4 cm, - 2 station. Pt has IUPC and has been on pitocin 26 miu most of the day without significant cervical change after amniotomy. Pitocin was stopped once to allow uterine receptors to clear and has  been restarted. I d/w pt that she should to start to transition into active labor soon if it is to happen. She would like to be rechecked in an hour.     Bouchra Monteiro MD

## 2022-08-19 NOTE — PLAN OF CARE
Goal Outcome Evaluation:  Plan of Care Reviewed With: patient, spouse        Progress: improving    Vss, comfortable with epidural, labor progressed to 3-4 cm, stalled at that dilation, given pitocin break and restarted at 1830. MD discussed ongoing POC with patient.

## 2022-08-19 NOTE — ANESTHESIA PREPROCEDURE EVALUATION
Anesthesia Evaluation     Patient summary reviewed and Nursing notes reviewed                Airway   Mallampati: II  TM distance: >3 FB  Neck ROM: full  No difficulty expected  Dental          Pulmonary     breath sounds clear to auscultation  (+) asthma,  COPD: exercise induced.  Cardiovascular - negative cardio ROS    Rhythm: regular  Rate: normal        Neuro/Psych  (+) psychiatric history Anxiety,    GI/Hepatic/Renal/Endo    (+)  GERD well controlled,  diabetes mellitus ( gestational) well controlled,     Musculoskeletal (-) negative ROS    Abdominal    Substance History - negative use     OB/GYN    (+) Pregnant,         Other - negative ROS           Phys Exam Other: All crowns across top                Anesthesia Plan    ASA 2     epidural       Anesthetic plan, risks, benefits, and alternatives have been provided, discussed and informed consent has been obtained with: patient and spouse/significant other.    Use of blood products discussed with patient and spouse/significant other .   Plan discussed with CRNA.        CODE STATUS:

## 2022-08-19 NOTE — PROGRESS NOTES
Pt comfortable with epidural. Cx 50-60%, 3 cm and - 2 station. AROM with clear fluid. FHTs 140s and reactive. Pt anamika every 2-4 minutes.   Bouchra Monteiro MD

## 2022-08-19 NOTE — PROGRESS NOTES
"LABOR PROGRESS NOTE    S:  Pt doing ok.  No complaints, except tired of lying in the bed.    O:  /56 (BP Location: Right arm, Patient Position: Lying)   Pulse 83   Temp 97.8 °F (36.6 °C) (Oral)   Resp 18   Ht 160 cm (63\")   LMP 11/11/2021   SpO2 98%   Breastfeeding No   BMI 32.70 kg/m²     FHTs R    Cxts rare    Cx:  1-2/60/-1, soft, midposition    A:  IOL # 2, stable fetus, cx more favorable    P:  Pitocin induction.    Alexis Martinez MD  08:25 EDT  08/19/22    "

## 2022-08-19 NOTE — ANESTHESIA PROCEDURE NOTES
Labor Epidural      Patient reassessed immediately prior to procedure    Patient location during procedure: OB  Start Time: 8/19/2022 1:44 PM  Stop Time: 8/19/2022 2:54 PM  Indication:at surgeon's request  Performed By  CRNA/JOSSY: Darlene Leiva CRNA  Preanesthetic Checklist  Completed: patient identified, IV checked, site marked, risks and benefits discussed, surgical consent, monitors and equipment checked, pre-op evaluation and timeout performed  Prep:  Pt Position:sitting  Sterile Tech:cap, gloves, mask and sterile barrier  Prep:chlorhexidine gluconate and isopropyl alcohol  Monitoring:blood pressure monitoring, continuous pulse oximetry and EKG  Epidural Block Procedure:  Approach:midline  Guidance:landmark technique and palpation technique  Location:L4-L5  Needle Type:Tuohy  Needle Gauge:19 G  Loss of Resistance Medium: saline  Loss of Resistance: 6cm  Cath Depth at skin:12 cm  Paresthesia: none  Aspiration:negative  Test Dose:negative  Number of Attempts: 1  Post Assessment:  Dressing:biopatch applied, occlusive dressing applied and secured with tape  Pt Tolerance:patient tolerated the procedure well with no apparent complications  Complications:no

## 2022-08-20 ENCOUNTER — APPOINTMENT (OUTPATIENT)
Dept: GENERAL RADIOLOGY | Facility: HOSPITAL | Age: 32
End: 2022-08-20

## 2022-08-20 LAB
BASOPHILS # BLD AUTO: 0.05 10*3/MM3 (ref 0–0.2)
BASOPHILS NFR BLD AUTO: 0.4 % (ref 0–1.5)
DEPRECATED RDW RBC AUTO: 39.8 FL (ref 37–54)
EOSINOPHIL # BLD AUTO: 0.01 10*3/MM3 (ref 0–0.4)
EOSINOPHIL NFR BLD AUTO: 0.1 % (ref 0.3–6.2)
ERYTHROCYTE [DISTWIDTH] IN BLOOD BY AUTOMATED COUNT: 12.7 % (ref 12.3–15.4)
GLUCOSE BLDC GLUCOMTR-MCNC: 101 MG/DL (ref 70–130)
HCT VFR BLD AUTO: 41 % (ref 34–46.6)
HGB BLD-MCNC: 14.1 G/DL (ref 12–15.9)
IMM GRANULOCYTES # BLD AUTO: 0.09 10*3/MM3 (ref 0–0.05)
IMM GRANULOCYTES NFR BLD AUTO: 0.6 % (ref 0–0.5)
LYMPHOCYTES # BLD AUTO: 1.71 10*3/MM3 (ref 0.7–3.1)
LYMPHOCYTES NFR BLD AUTO: 12.3 % (ref 19.6–45.3)
MCH RBC QN AUTO: 29.7 PG (ref 26.6–33)
MCHC RBC AUTO-ENTMCNC: 34.4 G/DL (ref 31.5–35.7)
MCV RBC AUTO: 86.5 FL (ref 79–97)
MONOCYTES # BLD AUTO: 0.69 10*3/MM3 (ref 0.1–0.9)
MONOCYTES NFR BLD AUTO: 5 % (ref 5–12)
NEUTROPHILS NFR BLD AUTO: 11.35 10*3/MM3 (ref 1.7–7)
NEUTROPHILS NFR BLD AUTO: 81.6 % (ref 42.7–76)
NRBC BLD AUTO-RTO: 0 /100 WBC (ref 0–0.2)
PLATELET # BLD AUTO: 242 10*3/MM3 (ref 140–450)
PMV BLD AUTO: 10.1 FL (ref 6–12)
RBC # BLD AUTO: 4.74 10*6/MM3 (ref 3.77–5.28)
WBC NRBC COR # BLD: 13.9 10*3/MM3 (ref 3.4–10.8)

## 2022-08-20 PROCEDURE — 85025 COMPLETE CBC W/AUTO DIFF WBC: CPT | Performed by: OBSTETRICS & GYNECOLOGY

## 2022-08-20 PROCEDURE — 82962 GLUCOSE BLOOD TEST: CPT

## 2022-08-20 PROCEDURE — 88307 TISSUE EXAM BY PATHOLOGIST: CPT

## 2022-08-20 PROCEDURE — 25010000002 KETOROLAC TROMETHAMINE PER 15 MG: Performed by: OBSTETRICS & GYNECOLOGY

## 2022-08-20 RX ADMIN — PRENATAL VIT W/ FE FUMARATE-FA TAB 27-0.8 MG 1 TABLET: 27-0.8 TAB at 08:07

## 2022-08-20 RX ADMIN — ACETAMINOPHEN 1000 MG: 500 TABLET ORAL at 00:45

## 2022-08-20 RX ADMIN — ACETAMINOPHEN 1000 MG: 500 TABLET ORAL at 19:28

## 2022-08-20 RX ADMIN — ACETAMINOPHEN 1000 MG: 500 TABLET ORAL at 06:40

## 2022-08-20 RX ADMIN — ACETAMINOPHEN 1000 MG: 500 TABLET ORAL at 13:20

## 2022-08-20 RX ADMIN — KETOROLAC TROMETHAMINE 15 MG: 30 INJECTION, SOLUTION INTRAMUSCULAR; INTRAVENOUS at 10:10

## 2022-08-20 RX ADMIN — KETOROLAC TROMETHAMINE 15 MG: 30 INJECTION, SOLUTION INTRAMUSCULAR; INTRAVENOUS at 04:18

## 2022-08-20 RX ADMIN — KETOROLAC TROMETHAMINE 15 MG: 30 INJECTION, SOLUTION INTRAMUSCULAR; INTRAVENOUS at 16:23

## 2022-08-20 NOTE — PROGRESS NOTES
Pt seen and examined and is still 50%, 3-4 cm and - 2 station. She has had 6 hours at 3-4 cm with no cervical change despite amniotomy and maximum pitocin dosing. D/w pt continued augmentation with pitocin as well as 1 LTCS for failure to progress.  She is interested in proceeding with 1 LTCS, which I feel is reasonable given her cervical exam.   Bouchra Monteiro MD

## 2022-08-20 NOTE — PLAN OF CARE
Goal Outcome Evaluation:  Plan of Care Reviewed With: patient, spouse        Progress: improving  Outcome Evaluation: vss, pain well managed with ERAS orders, incision CDI, up ad yina, fundus and lochia WDL

## 2022-08-20 NOTE — L&D DELIVERY NOTE
[facility]   Section Operative Note    Pre-Operative Dx:   1) 31 y.o.   2) IUP at 40.1  3) Indications for  section: failure to dilate  4) GDMA1  5) Both parents are CF carriers         Postoperative dx:     Same, plus:  6) S/P 1 LTCS     Procedure:  primary low transverse  section   Surgeon:   Bouchra Monteiro MD     Assistant: Silvano Bonds CFA   Anesthesia:  Epidural   EBL:   mls.  700 mls.         IV Fluids: 1,000 mls.   UOP: 50 mls.clear    I/O this shift:  In: 67.5   Out: 125 [Urine:125]   Antibiotics: cefoxitin (Mefoxin) and Zithromax     Infant:      Time of Delivery : 21: 02    Gender: Female infant- ADA    Weight: 7.1 #    Apgars:   8@ 1 minute /       9@ 5 minutes      Meconium:   Nuchal Cord:    no  no            Indication for C/Section: failure to progress                                     Procedure Details:   Once all questions were answered, the patient was given IV antibiotics for ID prophylaxis. Pt was taken to the OR where her epidural  anesthesia was dosed and found to be adequate. . A paris catheter was placed and hung to gravity for the duration of the procedure. SCD's were placed on the lower extremities bilaterally for DVT prophylaxis. Once the pt was prepped and draped and anesthesia was found to be adequate, a Pfannensteil skin incision was made on the abdomen and carried down to the fascia. The fascia was incised and extended with Henderson scissors. Kocher clamps were placed on the superior and inferior aspect of the fascia and the rectus abdominal muscles were sharply and bluntly taken down. The rectus abdominal muscles were  in the midline and the periteoneum was entered and bluntly extended. A bladder blade was then inserted and a bladder flap was created. A low transverse incision was made on the uterus and bluntly extended. Artificial rupture of membranes was performed with clear fluid noted. The fetal head was delivered followed by the  rest of the body. Cord was clamped and cut once it had stopped pulsating and the baby was taken to the warmer. Cord blood was collected and the placenta was delivered. The placenta was sent to pathology for review.  The uterus was exteriorized and cleared of all clots and debris. The uterine incision was repaired in 2 layers. The first layer was a running and locked fashion with 0-Vicryl and the second layer was an imbricating repair with 0-Vicryl suture. The uterus was firm and hemostatic. The abdomen was irrigated and aspirated with warm normal saline. The uterus was placed back into the abdomen. The fascia was reapproximated with 0-vicryl suture. The subcutaneous layer was irrigated and cauterized as needed for hemostasis. The skin was reapproximated with 4-0 vicryl. All counts were correct for the procedure.  Pt tolerated the procedure well. Mom and baby are stable and progressing well.   was responsible for performing the following activities: Retraction, Suction, Irrigation, Suturing, Closing, Placing Dressing and Delivery of Fetus and their skilled assistance was necessary for the success of this case.       Complications:   None      Disposition:   Mother to Remain in LD  in stable condition currently.   Baby to remains with mom  in stable condition currently.       Bouchra Monteiro MD  8/19/2022  21:20 EDT

## 2022-08-20 NOTE — PROGRESS NOTES
Patient: Bernardo Willie    @A@    Anesthesia Type: epidural  Patient location: Labor and Delivery  Last vitals  BP      Temp      Pulse     Resp      SpO2        Post vital signs: stable  Level of consciousness: awake, alert and oriented    Post-anesthesia pain: adequate analgesia  Airway patency: patent  Respiratory: unassisted  Cardiovascular: stable and blood pressure at baseline  Hydration: euvolemic    Difficult Airway: no  Anesthetic complications: noPatient: Bernardo Tapia    @A@    Anesthesia Type: epidural  Patient location: PACU  Last vitals  BP      Temp      Pulse     Resp      SpO2        Post vital signs: stable  Level of consciousness: awake, alert and oriented    Post-anesthesia pain: adequate analgesia  Airway patency: patent  Respiratory: unassisted  Cardiovascular: stable and blood pressure at baseline  Hydration: euvolemic    Difficult Airway: no  Anesthetic complications: no

## 2022-08-20 NOTE — H&P
Patient Care Team:  Raisa Buchanan MD as PCP - General (Internal Medicine)  Christal Chaudhari DO as Consulting Physician (Obstetrics and Gynecology)    Chief complaint failure to progress       HPI: 30 yo  with IUP @ 40.1 weeks here for IOL since yesterday at 5 cm. She received Pitocin all day yesterday without cervical change for months 1 to 2 cm dilated.  She then received Cervidil overnight.  She has been on high dose Pitocin all day and has also had amniotomy and has only progressed to 3 to 4 cm despite adequate time and power.  Discussed with patient options of continued infection versus  she would like to proceed with .  Her pregnancy has been complicated by class A1 gestational diabetes.  She has been under good control and her blood sugars were normal in labor.  Both parents are also CF carriers.  They declined amniocentesis.  The fetus has been having growth ultrasounds that have all been normal.      PMH:   Past Medical History:   Diagnosis Date   • ADHD    • Anxiety    • Asthma, exercise induced     inhaler as needed with exercise   • Cystic fibrosis carrier    • GDM (gestational diabetes mellitus), class A1 2022   • GERD (gastroesophageal reflux disease)    • Seasonal allergies          PSH:   Past Surgical History:   Procedure Laterality Date   • ORTHOPEDIC SURGERY      cyst removal on wrist   • WISDOM TOOTH EXTRACTION         SoHx:   Social History     Socioeconomic History   • Marital status:      Spouse name: Rickey   Tobacco Use   • Smoking status: Never Smoker   • Smokeless tobacco: Never Used   Vaping Use   • Vaping Use: Never used   Substance and Sexual Activity   • Alcohol use: Not Currently   • Drug use: Never   • Sexual activity: Yes     Partners: Male     Birth control/protection: None     Comment: currently pregnant       FHx:   Family History   Problem Relation Age of Onset   • Hypertension Mother    • Cystic fibrosis Mother    • Breast  "cancer Mother         stage 0 diagnosed >50   • No Known Problems Father        PGyn Hx:   deferred    POBHx:   12/2021- SAB  present      Allergies: Sulfa antibiotics    Medications:   No current facility-administered medications on file prior to encounter.     Current Outpatient Medications on File Prior to Encounter   Medication Sig Dispense Refill   • cetirizine (zyrTEC) 10 MG tablet Take 10 mg by mouth Daily.     • ipratropium (ATROVENT) 0.06 % nasal spray 2 sprays into the nostril(s) as directed by provider 4 (Four) Times a Day.     • prenatal vitamin (prenatal, CLASSIC, vitamin) tablet Take 1 tablet by mouth Daily.         /79   Pulse 94   Temp 98.2 °F (36.8 °C) (Oral)   Resp 20   Ht 160 cm (63\")   LMP 11/11/2021   SpO2 100%   Breastfeeding No   BMI 32.70 kg/m²     Review of Systems   Constitutional: Positive for activity change and fatigue.   Genitourinary: Positive for vaginal discharge.   Psychiatric/Behavioral: Positive for sleep disturbance.       Physical Exam  Vitals and nursing note reviewed.   Constitutional:       Appearance: She is well-developed.   HENT:      Head: Normocephalic and atraumatic.   Eyes:      General: No scleral icterus.     Conjunctiva/sclera: Conjunctivae normal.   Neck:      Thyroid: No thyromegaly.   Abdominal:      General: There is no distension.      Palpations: Abdomen is soft. There is no mass.      Tenderness: There is no abdominal tenderness. There is no guarding or rebound.      Hernia: No hernia is present.   Musculoskeletal:      Cervical back: Neck supple.   Skin:     General: Skin is warm and dry.   Neurological:      Mental Status: She is alert and oriented to person, place, and time.   Psychiatric:         Mood and Affect: Mood normal.         Behavior: Behavior normal.         Thought Content: Thought content normal.         Judgment: Judgment normal.         Debilities/Disabilities Identified: None    Labs:     Lab Results (last 7 days)     " Procedure Component Value Units Date/Time    COVID PRE-OP / PRE-PROCEDURE SCREENING ORDER (NO ISOLATION) - Swab, Nasal Cavity [[id number]]  (Normal) Collected: 08/18/22 0549    Specimen: Swab from Nasal Cavity Updated: 08/18/22 0643    Narrative:      The following orders were created for panel order COVID PRE-OP / PRE-PROCEDURE SCREENING ORDER (NO ISOLATION) - Swab, Nasal Cavity.  Procedure                               Abnormality         Status                     ---------                               -----------         ------                     COVID-19,[facility] IN-AMY...[[id number]]  Normal              Final result                 Please view results for these tests on the individual orders.    COVID-19,[facility] IN-HOUSE,Nasal Swab No Transport Media 3-4 HR TAT - Swab, Nasal Cavity [[id number]]  (Normal) Collected: 08/18/22 0549    Specimen: Swab from Nasal Cavity Updated: 08/18/22 0643     COVID19 Not Detected    Narrative:      Fact sheet for providers: https://www.fda.gov/media/100445/download     Fact sheet for patients: https://www.fda.gov/media/129061/download    Test performed by PCR.    Consider negative results in combination with clinical observations, patient history, and epidemiological information.    Urine Drug Screen - Urine, Clean Catch [[id number]]  (Normal) Collected: 08/18/22 0549    Specimen: Urine, Clean Catch Updated: 08/18/22 0609     THC, Screen, Urine Negative     Phencyclidine (PCP), Urine Negative     Cocaine Screen, Urine Negative     Methamphetamine, Ur Negative     Opiate Screen Negative     Amphetamine Screen, Urine Negative     Benzodiazepine Screen, Urine Negative     Tricyclic Antidepressants Screen Negative     Methadone Screen, Urine Negative     Barbiturates Screen, Urine Negative     Oxycodone Screen, Urine Negative     Propoxyphene Screen Negative     Buprenorphine, Screen, Urine Negative    Narrative:      Urine drug screen results are to be used for medical purposes  only.  They are not to be used for legal purposes such as employment testing.  Negative results do not necessarily mean the complete absence of a subtance, but rather that the result is less than the cutoff for that substance.  Positive results are unconfirmed and considered Preliminary Positive.  [facility] does not automatically confirm Postitive Unconfirmed results.  The physician may request (order) an Unconfirmed Positive result to be sent out for confirmation.      Negative Thresholds for Drugs Screened:    THC screen, urine                          50 ng/ml  Phenycyclidine (PCP), urine                25 ng/ml  Cocaine screen, urine                     150 ng/ml  Methamphetamine, urine                    500 ng/ml  Opiate screen, urine                      100 ng/ml  Amphetamine screen, urine                 500 ng/ml  Benzodiazepine screen, urine              150 ng/ml  Tricyclic Antidepressants screen, urine   300 ng/ml  Methadone screen, urine                   200 ng/ml  Barbiturates screen, urine                200 ng/ml  Oxycodone screen, urine                   100 ng/ml  Propoxyphene screen, urine                300 ng/ml  Buprenorphine screen, urine                10 ng/ml    Urinalysis With Microscopic If Indicated (No Culture) - Urine, Clean Catch [[id number]]  (Abnormal) Collected: 08/18/22 0549    Specimen: Urine, Clean Catch Updated: 08/18/22 0601     Color, UA Yellow     Appearance, UA Clear     pH, UA 5.5     Specific Gravity, UA 1.025     Glucose, UA Negative     Ketones, UA Negative     Bilirubin, UA Negative     Blood, UA Small (1+)     Protein, UA Negative     Leuk Esterase, UA Negative     Nitrite, UA Negative     Urobilinogen, UA 0.2 E.U./dL    Urinalysis, Microscopic Only - Urine, Clean Catch [[id number]]  (Abnormal) Collected: 08/18/22 0549    Specimen: Urine, Clean Catch Updated: 08/18/22 0601     RBC, UA 6-12 /HPF      WBC, UA None Seen /HPF      Bacteria, UA 1+ /HPF       Squamous Epithelial Cells, UA 13-20 /HPF      Hyaline Casts, UA None Seen /LPF      Methodology Manual Light Microscopy    CBC (No Diff) [[id number]]  (Normal) Collected: 22 0550    Specimen: Blood from Arm, Left Updated: 22 0559     WBC 8.61 10*3/mm3      RBC 5.13 10*6/mm3      Hemoglobin 14.9 g/dL      Hematocrit 44.3 %      MCV 86.4 fL      MCH 29.0 pg      MCHC 33.6 g/dL      RDW 13.2 %      RDW-SD 40.9 fl      MPV 9.9 fL      Platelets 254 10*3/mm3           Assessment/Plan:   1. 32 yo  with IUP @ 40.1 weeks on day #2 IOL with failure to progress into active labor. Plan 1 LTCS.Risks, benefits and alternatives of the procedure were discussed, including , but not limited to: infection, bleeding, transfusion, injury to adjacent structures, laparotomy, possible nondiagnostic findings, possible findings of unexpected malignancy, reoperation, recurrent symptoms, thromboembolic events, anaeasthetic complications and death. Pre/intra/postop course was reviewed and all questions answered. Patient was encouraged to call for any additional questions she might have in the future.   2. GMDA1- will check BG pp  3.  Both parents are CF+ carriers- alert peds  4.         I discussed the patients findings and my recommendations with patient, family, nursing staff and consulting provider.     Bouchra Monteiro MD  22  20:38 EDT

## 2022-08-20 NOTE — ADDENDUM NOTE
Addendum  created 08/19/22 2203 by Rickey Unger, CRNA    Order list changed, Pharmacy for encounter modified

## 2022-08-20 NOTE — ANESTHESIA POSTPROCEDURE EVALUATION
Patient: Bernardo Tapia    Procedure Summary     Date: 22 Room / Location: [facility] LABOR DELIVERY   [facility] LABOR DELIVERY    Anesthesia Start:  Anesthesia Stop:     Procedure:  SECTION PRIMARY (N/A Abdomen) Diagnosis: (Failure to Progress)    Surgeons: Bouchra Monteiro MD Provider: Rickey Unger CRNA    Anesthesia Type: epidural ASA Status: 2          Anesthesia Type: epidural    Vitals  Vitals Value Taken Time   /65 22   Temp 98.2 °F (36.8 °C) 22   Pulse 100 22   Resp 20 22   SpO2 100 % 22           Post Anesthesia Care and Evaluation    Patient location during evaluation: bedside  Patient participation: complete - patient participated  Level of consciousness: awake and alert  Pain score: 0  Pain management: adequate    Airway patency: patent  Anesthetic complications: No anesthetic complications  PONV Status: none  Cardiovascular status: acceptable  Respiratory status: acceptable  Hydration status: acceptable  Post Neuraxial Block status: No signs or symptoms of PDPH

## 2022-08-21 VITALS
RESPIRATION RATE: 18 BRPM | OXYGEN SATURATION: 98 % | DIASTOLIC BLOOD PRESSURE: 83 MMHG | SYSTOLIC BLOOD PRESSURE: 125 MMHG | HEIGHT: 63 IN | TEMPERATURE: 97.7 F | BODY MASS INDEX: 32.7 KG/M2 | HEART RATE: 89 BPM

## 2022-08-21 PROCEDURE — 0503F POSTPARTUM CARE VISIT: CPT | Performed by: OBSTETRICS & GYNECOLOGY

## 2022-08-21 RX ORDER — OXYCODONE HYDROCHLORIDE 5 MG/1
5 TABLET ORAL EVERY 4 HOURS PRN
Qty: 30 TABLET | Refills: 0 | Status: SHIPPED | OUTPATIENT
Start: 2022-08-21 | End: 2022-08-26

## 2022-08-21 RX ORDER — DOXYCYCLINE HYCLATE 50 MG/1
324 CAPSULE, GELATIN COATED ORAL
Qty: 30 TABLET | Refills: 6 | Status: SHIPPED | OUTPATIENT
Start: 2022-08-21 | End: 2022-08-31

## 2022-08-21 RX ORDER — IBUPROFEN 600 MG/1
600 TABLET ORAL EVERY 6 HOURS
Qty: 30 TABLET | Refills: 0 | Status: SHIPPED | OUTPATIENT
Start: 2022-08-21 | End: 2022-09-28

## 2022-08-21 RX ORDER — PSEUDOEPHEDRINE HCL 30 MG
100 TABLET ORAL 2 TIMES DAILY PRN
Qty: 60 EACH | Refills: 0 | Status: SHIPPED | OUTPATIENT
Start: 2022-08-21 | End: 2022-08-31

## 2022-08-21 RX ADMIN — OXYCODONE HYDROCHLORIDE 5 MG: 5 TABLET ORAL at 07:56

## 2022-08-21 RX ADMIN — IBUPROFEN 600 MG: 600 TABLET ORAL at 06:23

## 2022-08-21 RX ADMIN — PRENATAL VIT W/ FE FUMARATE-FA TAB 27-0.8 MG 1 TABLET: 27-0.8 TAB at 08:53

## 2022-08-21 RX ADMIN — ACETAMINOPHEN 325MG 650 MG: 325 TABLET ORAL at 03:46

## 2022-08-21 RX ADMIN — IBUPROFEN 600 MG: 600 TABLET ORAL at 00:40

## 2022-08-21 RX ADMIN — ACETAMINOPHEN 325MG 650 MG: 325 TABLET ORAL at 11:38

## 2022-08-21 NOTE — DISCHARGE SUMMARY
"Obstetrical Discharge Form    Primary OB Clinician: CORTEZ      Preadmission Diagnosis:  1. Bernardo Tapia is a 31 y.o.  with IUP @ 40w1d   2. IOL for GDMA1  3. Both parents CF carriers      Discharge Diagnosis:  Same, plus:   4. Failure to progress  5. S/P 1 LTCS    Antepartum complications: gestational diabetes and CF carriers    Date of Delivery:  2022     Delivered By: Bouchra Monteiro     Delivery Type: primary  section, low transverse incision    Tubal Ligation: n/a    Baby: Liveborn female, Apgars 8/9, weight 7 #, 1 oz,   113  oz    Anesthesia: epidural    Intrapartum complications: Failure to Progress    Laceration: n/a    Feeding method: bottle -     Hospital Course: Bernardo Tapia is a 31 y.o.   who presented with an IUP 40w1d who was admitted for induction of labor secondary to gestational diabetes mellitus class A1.  Her blood sugars were well controlled in labor.  She did not progress beyond 3 to 4 cm and underwent a primary low transverse  section.  Her postoperative course was uncomplicated.  Her blood sugars were normal.  By postoperative day #2, she was ambulating, tolerating a regular diet and requesting discharge home.  She does plan on bottlefeeding and starting oral contraceptives at 4 weeks.  Postop instructions restrictions reviewed the patient detail and all her questions were answered.    Discharge Date: 2022; Discharge Time: 12:04 EDT    Review of Systems   Constitutional: Positive for activity change and fatigue.   Gastrointestinal: Positive for abdominal pain.   Genitourinary: Positive for vaginal bleeding.   Psychiatric/Behavioral: Positive for sleep disturbance.   All other systems reviewed and are negative.    /83 (BP Location: Right arm, Patient Position: Sitting)   Pulse 89   Temp 97.7 °F (36.5 °C) (Oral)   Resp 18   Ht 160 cm (63\")   LMP 2021   SpO2 98%   Breastfeeding Unknown   BMI 32.70 kg/m²      Physical " Exam  Vitals and nursing note reviewed.   Constitutional:       Appearance: Normal appearance. She is well-developed.   HENT:      Head: Normocephalic and atraumatic.   Eyes:      General: No scleral icterus.     Conjunctiva/sclera: Conjunctivae normal.   Neck:      Thyroid: No thyromegaly.   Abdominal:      General: Bowel sounds are normal. There is no distension.      Palpations: Abdomen is soft. There is no mass.      Tenderness: There is no abdominal tenderness. There is no guarding or rebound.      Hernia: No hernia is present.      Comments: Inc C/D/I, no erythema   Skin:     General: Skin is warm and dry.   Neurological:      Mental Status: She is alert and oriented to person, place, and time.   Psychiatric:         Mood and Affect: Mood normal.         Behavior: Behavior normal.         Thought Content: Thought content normal.         Judgment: Judgment normal.         Results from last 7 days   Lab Units 08/20/22  0640   HEMOGLOBIN g/dL 14.1     Infant: female  Feeding:bottle  Rh status: positive, Rhogam was not indicated  Rubella: Immune  Diabetes: yes - GMDA1, nl BG , repeat 2 hour GTT at 6 weeks  Contraception: OCP (estrogen/progesterone)        Plan:    Patient given written instruction sheet.  Follow-up appointment with [facility] in 2 weeks.    Discharge time was less than 30 minutes.     Bouchra Monteiro MD  12:04 EDT  8/21/2022

## 2022-08-21 NOTE — PLAN OF CARE
Problem: Adjustment to Role Transition (Postpartum  Delivery)  Goal: Successful Maternal Role Transition  Outcome: Met     Problem: Bleeding (Postpartum  Delivery)  Goal: Hemostasis  Outcome: Met     Problem: Infection (Postpartum  Delivery)  Goal: Absence of Infection Signs and Symptoms  Outcome: Met     Problem: Pain (Postpartum  Delivery)  Goal: Acceptable Pain Control  Outcome: Met  Intervention: Prevent or Manage Pain  Recent Flowsheet Documentation  Taken 2022 0756 by Deonna Devi RN  Pain Management Interventions: see MAR     Problem: Postoperative Nausea and Vomiting (Postpartum  Delivery)  Goal: Nausea and Vomiting Relief  Outcome: Met     Problem: Postoperative Urinary Retention (Postpartum  Delivery)  Goal: Effective Urinary Elimination  Outcome: Met     Problem: Adult Inpatient Plan of Care  Goal: Plan of Care Review  Outcome: Met  Flowsheets (Taken 2022 1408)  Progress: improving  Plan of Care Reviewed With:   patient   spouse  Outcome Evaluation: VSS, up ad yina, pain well controlled with prn and scheduled pain medications. d/c home today  Goal: Patient-Specific Goal (Individualized)  Outcome: Met  Goal: Absence of Hospital-Acquired Illness or Injury  Outcome: Met  Intervention: Identify and Manage Fall Risk  Recent Flowsheet Documentation  Taken 2022 09 by Deonna Devi RN  Safety Promotion/Fall Prevention: safety round/check completed  Intervention: Prevent and Manage VTE (Venous Thromboembolism) Risk  Recent Flowsheet Documentation  Taken 2022 09 by Deonna Devi RN  Activity Management: up ad yina  Goal: Optimal Comfort and Wellbeing  Outcome: Met  Intervention: Monitor Pain and Promote Comfort  Recent Flowsheet Documentation  Taken 2022 0756 by Deonna Devi RN  Pain Management Interventions: see MAR  Intervention: Provide Person-Centered Care  Recent Flowsheet Documentation  Taken 2022 09 by Marito  Deonna RN  Trust Relationship/Rapport:   care explained   questions answered   questions encouraged   thoughts/feelings acknowledged  Goal: Readiness for Transition of Care  Outcome: Met   Goal Outcome Evaluation:  Plan of Care Reviewed With: patient, spouse        Progress: improving  Outcome Evaluation: VSS, up ad yina, pain well controlled with prn and scheduled pain medications. d/c home today

## 2022-08-21 NOTE — CASE MANAGEMENT/SOCIAL WORK
Case Management Discharge Note      Final Note: Discharged home.         Selected Continued Care - Discharged on 8/21/2022 Admission date: 8/18/2022 - Discharge disposition: Home or Self Care    Destination    No services have been selected for the patient.              Durable Medical Equipment    No services have been selected for the patient.              Dialysis/Infusion    No services have been selected for the patient.              Home Medical Care    No services have been selected for the patient.              Therapy    No services have been selected for the patient.              Community Resources    No services have been selected for the patient.              Community & DME    No services have been selected for the patient.                       Final Discharge Disposition Code: 01 - home or self-care

## 2022-08-21 NOTE — PLAN OF CARE
Goal Outcome Evaluation:  Plan of Care Reviewed With: patient        Progress: improving  Outcome Evaluation: vital signs stable, up ad yina w/o difficulties, pain well controlled with po scheduled meds, insicion and fundal checks wnl, plans for d/c later today

## 2022-08-23 ENCOUNTER — TELEPHONE (OUTPATIENT)
Dept: OBSTETRICS AND GYNECOLOGY | Facility: CLINIC | Age: 32
End: 2022-08-23

## 2022-08-23 NOTE — TELEPHONE ENCOUNTER
DELETE AFTER REVIEWING: Telephone encounter to be sent to the clinical pool   Hub staff attempted to follow warm transfer process and was unsuccessful     Caller: TR ROSENBERG     Best call back number: [phone] OK TO [facility] .     Patient is needing: SCHEDULE 2wk PP 9/2/22 FIRST AVAILABLE WAS 12/3/22    PT AVAILABLE ANY TIME @ [facility]

## 2022-08-25 LAB
LAB AP CASE REPORT: NORMAL
PATH REPORT.FINAL DX SPEC: NORMAL

## 2022-08-31 ENCOUNTER — POSTPARTUM VISIT (OUTPATIENT)
Dept: OBSTETRICS AND GYNECOLOGY | Facility: CLINIC | Age: 32
End: 2022-08-31

## 2022-08-31 VITALS
HEIGHT: 63 IN | BODY MASS INDEX: 29.52 KG/M2 | WEIGHT: 166.6 LBS | SYSTOLIC BLOOD PRESSURE: 128 MMHG | DIASTOLIC BLOOD PRESSURE: 92 MMHG

## 2022-08-31 PROCEDURE — 0503F POSTPARTUM CARE VISIT: CPT | Performed by: NURSE PRACTITIONER

## 2022-08-31 NOTE — PROGRESS NOTES
OB follow up     Bernardo Tapia is a 31 y.o.  40w1d being seen today for her obstetrical visit.  Patient reports no bleeding, no contractions and no leaking. Fetal movement: normal.     Review of Systems  No bleeding, No cramping/contractions     /88   Wt 83.5 kg (184 lb)   LMP 2021   BMI 32.59 kg/m²     FHT: present BPM   Uterine Size: 37 cm       Assessment/Plan:    1) 31 y.o.  -pregnancy at 40w1d    2)   Encounter Diagnoses   Name Primary?   • Prenatal care in third trimester Yes   • GDM (gestational diabetes mellitus), class A1        3) Reviewed this stage of pregnancy  4) Problem list updated     Return in about 1 week (around 2022) for OB INT.      Domingo Love MD    2022  12:52 EDT

## 2022-09-28 ENCOUNTER — TELEPHONE (OUTPATIENT)
Dept: OBSTETRICS AND GYNECOLOGY | Facility: CLINIC | Age: 32
End: 2022-09-28

## 2022-09-28 ENCOUNTER — POSTPARTUM VISIT (OUTPATIENT)
Dept: OBSTETRICS AND GYNECOLOGY | Facility: CLINIC | Age: 32
End: 2022-09-28

## 2022-09-28 VITALS
BODY MASS INDEX: 30.65 KG/M2 | HEIGHT: 63 IN | SYSTOLIC BLOOD PRESSURE: 122 MMHG | WEIGHT: 173 LBS | DIASTOLIC BLOOD PRESSURE: 98 MMHG

## 2022-09-28 LAB
B-HCG UR QL: NEGATIVE
BILIRUB BLD-MCNC: NEGATIVE MG/DL
CLARITY, POC: CLEAR
COLOR UR: YELLOW
EXPIRATION DATE: NORMAL
GLUCOSE UR STRIP-MCNC: NEGATIVE MG/DL
INTERNAL NEGATIVE CONTROL: NEGATIVE
INTERNAL POSITIVE CONTROL: POSITIVE
KETONES UR QL: NEGATIVE
LEUKOCYTE EST, POC: NEGATIVE
Lab: NORMAL
NITRITE UR-MCNC: NEGATIVE MG/ML
PH UR: 5 [PH] (ref 5–8)
PROT UR STRIP-MCNC: NEGATIVE MG/DL
RBC # UR STRIP: ABNORMAL /UL
SP GR UR: 1 (ref 1–1.03)
UROBILINOGEN UR QL: NORMAL

## 2022-09-28 PROCEDURE — 0503F POSTPARTUM CARE VISIT: CPT | Performed by: NURSE PRACTITIONER

## 2022-09-28 PROCEDURE — 81025 URINE PREGNANCY TEST: CPT | Performed by: NURSE PRACTITIONER

## 2022-09-28 RX ORDER — FLUOXETINE HYDROCHLORIDE 20 MG/1
20 CAPSULE ORAL DAILY
Qty: 30 CAPSULE | Refills: 3 | Status: SHIPPED | OUTPATIENT
Start: 2022-09-28

## 2022-09-28 RX ORDER — AMOXICILLIN 500 MG/1
500 CAPSULE ORAL 3 TIMES DAILY
COMMUNITY
Start: 2022-09-16 | End: 2022-10-11

## 2022-10-11 ENCOUNTER — OFFICE VISIT (OUTPATIENT)
Dept: OBSTETRICS AND GYNECOLOGY | Facility: CLINIC | Age: 32
End: 2022-10-11

## 2022-10-11 VITALS
DIASTOLIC BLOOD PRESSURE: 74 MMHG | SYSTOLIC BLOOD PRESSURE: 122 MMHG | HEIGHT: 63 IN | WEIGHT: 169.8 LBS | BODY MASS INDEX: 30.09 KG/M2

## 2022-10-11 DIAGNOSIS — Z30.430 ENCOUNTER FOR INSERTION OF INTRAUTERINE CONTRACEPTIVE DEVICE (IUD): ICD-10-CM

## 2022-10-11 DIAGNOSIS — Z30.430 ENCOUNTER FOR IUD INSERTION: Primary | ICD-10-CM

## 2022-10-11 LAB
B-HCG UR QL: NEGATIVE
EXPIRATION DATE: NORMAL
INTERNAL NEGATIVE CONTROL: NEGATIVE
INTERNAL POSITIVE CONTROL: POSITIVE
Lab: 55

## 2022-10-11 PROCEDURE — 81025 URINE PREGNANCY TEST: CPT | Performed by: STUDENT IN AN ORGANIZED HEALTH CARE EDUCATION/TRAINING PROGRAM

## 2022-10-11 PROCEDURE — 58300 INSERT INTRAUTERINE DEVICE: CPT | Performed by: STUDENT IN AN ORGANIZED HEALTH CARE EDUCATION/TRAINING PROGRAM

## 2022-10-11 NOTE — PROGRESS NOTES
29 yo  here today for IUD placement. Not sexually active yet, not BF. Doing well. ROS neg      PMH:   Past Medical History:   Diagnosis Date   • ADHD    • Anxiety    • Asthma, exercise induced     inhaler as needed with exercise   • Cystic fibrosis carrier    • GDM (gestational diabetes mellitus), class A1 2022   • GERD (gastroesophageal reflux disease)    • Seasonal allergies                 PSH:     Past Surgical History:   Procedure Laterality Date   •  SECTION N/A 2022    Procedure:  SECTION PRIMARY;  Surgeon: Bouchra Monteiro MD;  Location:  [facility] LABOR DELIVERY;  Service: Obstetrics/Gynecology;  Laterality: N/A;   • ORTHOPEDIC SURGERY      cyst removal on wrist   • WISDOM TOOTH EXTRACTION              POB hx: See above   PGYN hx:  STD Denies   Pap NILM    Contraception IUD    Menarche        Social hx:   Social History     Socioeconomic History   • Marital status:      Spouse name: Rickey   Tobacco Use   • Smoking status: Never   • Smokeless tobacco: Never   Vaping Use   • Vaping Use: Never used   Substance and Sexual Activity   • Alcohol use: Not Currently   • Drug use: Never   • Sexual activity: Yes     Partners: Male     Birth control/protection: None     Comment: currently pregnant        [  X ] no h/o abuse/DV:         [ X  ] No ETOH, tobacco, drugs  [   ] Tobacco use   [   ] Alcohol use  [   ] Drug use   Marital status:           [  X ] FOB involved     Significant family hx:   Denies                 [ X  ] No birth defects, stillbirth           Allergies:       Allergies   Allergen Reactions   • Sulfa Antibiotics Hives and Nausea And Vomiting                  Meds:   Current Outpatient Medications:   •  Calcium Carb-Cholecalciferol (CALCIUM 500 + D3 PO), , Disp: , Rfl:   •  cetirizine (zyrTEC) 10 MG tablet, Take 10 mg by mouth Daily., Disp: , Rfl:   •  FLUoxetine (PROzac) 20 MG capsule, Take 1 capsule by mouth Daily., Disp: 30 capsule, Rfl:  3  •  fluticasone (FLONASE) 50 MCG/ACT nasal spray, , Disp: , Rfl:   •  ipratropium (ATROVENT) 0.06 % nasal spray, 2 sprays into the nostril(s) as directed by provider 4 (Four) Times a Day., Disp: , Rfl:   •  Misc Natural Products (FIBER SUPREME PO), Take  by mouth., Disp: , Rfl:   •  prenatal vitamin (prenatal, CLASSIC, vitamin) tablet, Take 1 tablet by mouth Daily., Disp: , Rfl:      Review of Systems     Vitals:    10/11/22 1016   BP: 122/74        OBGyn Exam     Vitals: VSS; AF    General Appearance:  Awake. Alert. Well developed. Well nourished. In no acute distress.    Neck:  The neck was normal; no abnormalities noted on palpation.  Trachea: midline and showed no abnormalities.  Lymph Nodes:  Nontender; no lymphadenopathy  Breasts:  General/bilateral:  ° Nipples showed no abnormalities. ° No breast asymmetry was observed. ° No scar on breast. ° No breast mass was found. ° No tenderness of the breast.  Lungs:  ° Clear to auscultation bilaterally without wheezes, rales or rhonchi.  Cardiovascular:  ° System: RRR, no murmur, consistent with normal pregnancy.  Abdomen:  Visual Inspection: ° Abdomen was normal on visual inspection.  Palpation: ° Abdomen was soft. ° Abdominal non-tender.  Genitalia:  External: ° Vulva was normal. ° Labia showed no abnormalities. ° Clitoris was normal. ° [place]'s gland was normal. ° Bartholin's gland was normal. ° Genitalia exhibited no lesion.  Vagina: ° Mucosa was normal. ° Not tender. ° No vaginal mass was observed.  Cervix: ° No cervical discharge. ° Not tender. IUD placed   Uterus:  ° Not tender.  Uterine Adnexae: ° Normal without masses or tenderness.  Neurological:  ° Oriented to time, place, and person.  Skin:  ° General appearance was normal. No bruising or ecchymosis.    Procedure: Intrauterine device insertion    Pre procedure indication 1) Desires Kyleena  Post procedure indication 1) Desires Kyleena    The risks, benefits, and alternatives to IUD were explained at length  with the patient. All her questions were answered and consents were signed.  Urine pregnancy test was negative.  Patient is not on her cycle. She currently uses: Contraception: abstinence    The patient was placed in a dorsal lithotomy position on the examining table in [facility]. A bimanual exam confirmed the uterus was normal in size, anteverted. A warmed metal speculum was inserted into the vagina and the cervix was brought into view.    The cervix was prepped with Betadine. The anterior lip was grasped with a single-tooth tenaculum. The endometrial cavity was then sounded to 6 cm without use of a dilator.     The  was then carefully advanced to the cervical canal into the uterus to the level of the fundus. This was then backed off about 1.5-2 cm to allow sufficient space for the arms to open. The device was deployed. The  was removed carefully from the uterus. The threads were then cut leaving 2-3 cm visible outside of the cervix.  The single-tooth tenaculum was removed from the anterior lip. Good hemostasis was noted.     All other instruments were removed from the vagina.   There were no complications.  The patient tolerated the procedure well with a minimal amount of discomfort.    The patient was counseled about the need to return in 4 weeks for string check.     She was counseled about the need to use a backup method of contraception such as condoms for 1-2 weeks. The patient is counseled to contact us if she has any significant or increasing bleeding, pain, fever, chills, or other concerns. She is instructed to see a doctor right away if she believes that she may be pregnant at any time with the IUD in place.    LOT # [id number]  Exp Aug 2024       Luke Veras DO    10/11/2022  10:36 EDT            Diagnoses and all orders for this visit:    1. Encounter for IUD insertion (Primary)  -     POC Pregnancy, Urine    2. Postpartum follow-up    3. Encounter for insertion of intrauterine  contraceptive device (IUD)         Lkue Veras DO  10/11/2022    10:34 EDT

## 2022-11-08 ENCOUNTER — OFFICE VISIT (OUTPATIENT)
Dept: OBSTETRICS AND GYNECOLOGY | Facility: CLINIC | Age: 32
End: 2022-11-08

## 2022-11-08 VITALS
HEIGHT: 63 IN | WEIGHT: 170 LBS | BODY MASS INDEX: 30.12 KG/M2 | SYSTOLIC BLOOD PRESSURE: 126 MMHG | DIASTOLIC BLOOD PRESSURE: 88 MMHG

## 2022-11-08 DIAGNOSIS — Z30.431 IUD CHECK UP: ICD-10-CM

## 2022-11-08 DIAGNOSIS — Z01.419 PAP SMEAR, LOW-RISK: Primary | ICD-10-CM

## 2022-11-08 DIAGNOSIS — Z01.419 ROUTINE GYNECOLOGICAL EXAMINATION: ICD-10-CM

## 2022-11-08 DIAGNOSIS — Z11.51 ENCOUNTER FOR SCREENING FOR HUMAN PAPILLOMAVIRUS (HPV): ICD-10-CM

## 2022-11-08 PROBLEM — Z34.90 PREGNANCY: Status: RESOLVED | Noted: 2022-03-01 | Resolved: 2022-11-08

## 2022-11-08 LAB
B-HCG UR QL: NEGATIVE
BILIRUB BLD-MCNC: NEGATIVE MG/DL
CLARITY, POC: CLEAR
COLOR UR: YELLOW
EXPIRATION DATE: NORMAL
GLUCOSE UR STRIP-MCNC: NEGATIVE MG/DL
INTERNAL NEGATIVE CONTROL: NEGATIVE
INTERNAL POSITIVE CONTROL: POSITIVE
KETONES UR QL: NEGATIVE
LEUKOCYTE EST, POC: NEGATIVE
Lab: 55
NITRITE UR-MCNC: NEGATIVE MG/ML
PH UR: 5 [PH] (ref 5–8)
PROT UR STRIP-MCNC: NEGATIVE MG/DL
RBC # UR STRIP: NEGATIVE /UL
SP GR UR: 1 (ref 1–1.03)
UROBILINOGEN UR QL: NORMAL

## 2022-11-08 PROCEDURE — 81025 URINE PREGNANCY TEST: CPT | Performed by: STUDENT IN AN ORGANIZED HEALTH CARE EDUCATION/TRAINING PROGRAM

## 2022-11-08 PROCEDURE — 81002 URINALYSIS NONAUTO W/O SCOPE: CPT | Performed by: STUDENT IN AN ORGANIZED HEALTH CARE EDUCATION/TRAINING PROGRAM

## 2022-11-08 PROCEDURE — 99395 PREV VISIT EST AGE 18-39: CPT | Performed by: STUDENT IN AN ORGANIZED HEALTH CARE EDUCATION/TRAINING PROGRAM

## 2022-11-08 NOTE — PROGRESS NOTES
31 yo  here today for annual exam.  Not sexually active yet, not BF. Doing well. Desires Pap screen today. ROS neg      PMH:   Past Medical History:   Diagnosis Date   • ADHD    • Anxiety    • Asthma, exercise induced     inhaler as needed with exercise   • Cystic fibrosis carrier    • GDM (gestational diabetes mellitus), class A1 2022   • GERD (gastroesophageal reflux disease)    • Seasonal allergies                 PSH:     Past Surgical History:   Procedure Laterality Date   •  SECTION N/A 2022    Procedure:  SECTION PRIMARY;  Surgeon: Bouchra Monteiro MD;  Location: [facility] LABOR DELIVERY;  Service: Obstetrics/Gynecology;  Laterality: N/A;   • ORTHOPEDIC SURGERY      cyst removal on wrist   • WISDOM TOOTH EXTRACTION              POB hx: See above   PGYN hx:  STD Denies   Pap NILM    Pap screen  ( )   Contraception IUD ; string in place    Menarche        Social hx:   Social History     Socioeconomic History   • Marital status:      Spouse name: Rickey   Tobacco Use   • Smoking status: Never   • Smokeless tobacco: Never   Vaping Use   • Vaping Use: Never used   Substance and Sexual Activity   • Alcohol use: Not Currently   • Drug use: Never   • Sexual activity: Yes     Partners: Male     Birth control/protection: None     Comment: currently pregnant        [  X ] no h/o abuse/DV:         [ X  ] No ETOH, tobacco, drugs  [   ] Tobacco use   [   ] Alcohol use  [   ] Drug use   Marital status:           [  X ] FOB involved     Significant family hx:   Denies                 [ X  ] No birth defects, stillbirth           Allergies:       Allergies   Allergen Reactions   • Sulfa Antibiotics Hives and Nausea And Vomiting                  Meds:   Current Outpatient Medications:   •  Calcium Carb-Cholecalciferol (CALCIUM 500 + D3 PO), , Disp: , Rfl:   •  cetirizine (zyrTEC) 10 MG tablet, Take 10 mg by mouth Daily., Disp: , Rfl:   •  FLUoxetine (PROzac) 20  MG capsule, Take 1 capsule by mouth Daily., Disp: 30 capsule, Rfl: 3  •  fluticasone (FLONASE) 50 MCG/ACT nasal spray, , Disp: , Rfl:   •  ipratropium (ATROVENT) 0.06 % nasal spray, 2 sprays into the nostril(s) as directed by provider 4 (Four) Times a Day., Disp: , Rfl:   •  Misc Natural Products (FIBER SUPREME PO), Take  by mouth., Disp: , Rfl:   •  prenatal vitamin (prenatal, CLASSIC, vitamin) tablet, Take 1 tablet by mouth Daily., Disp: , Rfl:      Review of Systems     Vitals:    11/08/22 1421   BP: 126/88        OBGyn Exam     Vitals: VSS; AF    General Appearance:  Awake. Alert. Well developed. Well nourished. In no acute distress.    Lungs:  ° Clear to auscultation bilaterally without wheezes, rales or rhonchi.  Cardiovascular:  ° System: RRR, no murmur, consistent with normal pregnancy.  Abdomen:  Visual Inspection: ° Abdomen was normal on visual inspection.  Palpation: ° Abdomen was soft. ° Abdominal non-tender.  Genitalia:  External: ° Vulva was normal. ° Labia showed no abnormalities. ° Clitoris was normal. ° [place]'s gland was normal. ° Bartholin's gland was normal. ° Genitalia exhibited no lesion.  Vagina: ° Mucosa was normal. ° Not tender. ° No vaginal mass was observed.  Cervix: ° No cervical discharge. ° Not tender. IUD strings visualized; Pap obtained   Uterus:  ° Not tender.  Uterine Adnexae: ° Normal without masses or tenderness.  Neurological:  ° Oriented to time, place, and person.  Skin:  ° General appearance was normal. No bruising or ecchymosis.        Diagnoses and all orders for this visit:    1. Pap smear, low-risk (Primary)  -     IGP, Apt HPV,rfx 16 / 18,45    2. Routine gynecological examination  -     POC Pregnancy, Urine  -     POC Urinalysis Dipstick    3. Encounter for screening for human papillomavirus (HPV)  -     IGP, Apt HPV,rfx 16 / 18,45    4. Postpartum follow-up    5. IUD check up     A/P   Pap screen today  Pap obtained; f/u per [facility] guidelines     IUD string check  IUD  strings visualized     F/U 1 year or prn     Luke Veras DO    11/8/2022  14:34 EST

## 2022-11-29 PROBLEM — Z11.51 ENCOUNTER FOR SCREENING FOR HUMAN PAPILLOMAVIRUS (HPV): Status: ACTIVE | Noted: 2022-11-29

## 2022-11-29 PROBLEM — Z01.419 PAP SMEAR, LOW-RISK: Status: ACTIVE | Noted: 2022-11-29

## 2022-11-29 PROBLEM — Z01.419 ROUTINE GYNECOLOGICAL EXAMINATION: Status: ACTIVE | Noted: 2022-11-29

## 2023-04-23 ENCOUNTER — HOSPITAL ENCOUNTER (EMERGENCY)
Facility: HOSPITAL | Age: 33
Discharge: HOME | End: 2023-04-23
Attending: EMERGENCY MEDICINE | Admitting: EMERGENCY MEDICINE
Payer: COMMERCIAL

## 2023-04-23 ENCOUNTER — APPOINTMENT (OUTPATIENT)
Dept: CT IMAGING | Facility: HOSPITAL | Age: 33
End: 2023-04-23
Payer: COMMERCIAL

## 2023-04-23 VITALS
HEIGHT: 63 IN | BODY MASS INDEX: 30.83 KG/M2 | WEIGHT: 174 LBS | SYSTOLIC BLOOD PRESSURE: 174 MMHG | TEMPERATURE: 98.2 F | RESPIRATION RATE: 18 BRPM | HEART RATE: 111 BPM | DIASTOLIC BLOOD PRESSURE: 65 MMHG | OXYGEN SATURATION: 96 %

## 2023-04-23 DIAGNOSIS — N20.0 KIDNEY STONE: Primary | ICD-10-CM

## 2023-04-23 LAB
ALBUMIN SERPL-MCNC: 4.1 G/DL (ref 3.5–5.2)
ALBUMIN/GLOB SERPL: 1.4 G/DL
ALP SERPL-CCNC: 100 U/L (ref 39–117)
ALT SERPL W P-5'-P-CCNC: 23 U/L (ref 1–33)
ANION GAP SERPL CALCULATED.3IONS-SCNC: 9.4 MMOL/L (ref 5–15)
AST SERPL-CCNC: 21 U/L (ref 1–32)
B-HCG UR QL: NEGATIVE
BACTERIA UR QL AUTO: ABNORMAL /HPF
BILIRUB SERPL-MCNC: <0.2 MG/DL (ref 0–1.2)
BILIRUB UR QL STRIP: NEGATIVE
BUN SERPL-MCNC: 14 MG/DL (ref 6–20)
BUN/CREAT SERPL: 14.3 (ref 7–25)
CALCIUM SPEC-SCNC: 9.4 MG/DL (ref 8.6–10.5)
CHLORIDE SERPL-SCNC: 102 MMOL/L (ref 98–107)
CLARITY UR: CLEAR
CO2 SERPL-SCNC: 26.6 MMOL/L (ref 22–29)
COLOR UR: YELLOW
CREAT SERPL-MCNC: 0.98 MG/DL (ref 0.57–1)
EGFRCR SERPLBLD CKD-EPI 2021: 77.8 ML/MIN/1.73
GLOBULIN UR ELPH-MCNC: 3 GM/DL
GLUCOSE SERPL-MCNC: 131 MG/DL (ref 65–99)
GLUCOSE UR STRIP-MCNC: NEGATIVE MG/DL
HGB UR QL STRIP.AUTO: ABNORMAL
HYALINE CASTS UR QL AUTO: ABNORMAL /LPF
KETONES UR QL STRIP: NEGATIVE
LEUKOCYTE ESTERASE UR QL STRIP.AUTO: NEGATIVE
LIPASE SERPL-CCNC: 34 U/L (ref 13–60)
NITRITE UR QL STRIP: NEGATIVE
PH UR STRIP.AUTO: 5.5 [PH] (ref 4.5–8)
POTASSIUM SERPL-SCNC: 4.1 MMOL/L (ref 3.5–5.2)
PROT SERPL-MCNC: 7.1 G/DL (ref 6–8.5)
PROT UR QL STRIP: NEGATIVE
RBC # UR STRIP: ABNORMAL /HPF
REF LAB TEST METHOD: ABNORMAL
SODIUM SERPL-SCNC: 138 MMOL/L (ref 136–145)
SP GR UR STRIP: ABNORMAL
SQUAMOUS #/AREA URNS HPF: ABNORMAL /HPF
UROBILINOGEN UR QL STRIP: ABNORMAL
WBC # UR STRIP: ABNORMAL /HPF

## 2023-04-23 PROCEDURE — 25010000002 MORPHINE PER 10 MG: Performed by: EMERGENCY MEDICINE

## 2023-04-23 PROCEDURE — 74176 CT ABD & PELVIS W/O CONTRAST: CPT

## 2023-04-23 PROCEDURE — 99283 EMERGENCY DEPT VISIT LOW MDM: CPT

## 2023-04-23 PROCEDURE — 36415 COLL VENOUS BLD VENIPUNCTURE: CPT

## 2023-04-23 PROCEDURE — 96374 THER/PROPH/DIAG INJ IV PUSH: CPT

## 2023-04-23 PROCEDURE — 80053 COMPREHEN METABOLIC PANEL: CPT | Performed by: EMERGENCY MEDICINE

## 2023-04-23 PROCEDURE — 81025 URINE PREGNANCY TEST: CPT | Performed by: EMERGENCY MEDICINE

## 2023-04-23 PROCEDURE — 96375 TX/PRO/DX INJ NEW DRUG ADDON: CPT

## 2023-04-23 PROCEDURE — 83690 ASSAY OF LIPASE: CPT | Performed by: EMERGENCY MEDICINE

## 2023-04-23 PROCEDURE — 25010000002 KETOROLAC TROMETHAMINE PER 15 MG: Performed by: EMERGENCY MEDICINE

## 2023-04-23 PROCEDURE — 81001 URINALYSIS AUTO W/SCOPE: CPT | Performed by: EMERGENCY MEDICINE

## 2023-04-23 RX ORDER — HYDROCODONE BITARTRATE AND ACETAMINOPHEN 5; 325 MG/1; MG/1
1 TABLET ORAL EVERY 6 HOURS PRN
Qty: 15 TABLET | Refills: 0 | Status: SHIPPED | OUTPATIENT
Start: 2023-04-23

## 2023-04-23 RX ORDER — KETOROLAC TROMETHAMINE 30 MG/ML
15 INJECTION, SOLUTION INTRAMUSCULAR; INTRAVENOUS ONCE
Status: COMPLETED | OUTPATIENT
Start: 2023-04-23 | End: 2023-04-23

## 2023-04-23 RX ORDER — ONDANSETRON 4 MG/1
4 TABLET, ORALLY DISINTEGRATING ORAL EVERY 8 HOURS PRN
Qty: 20 TABLET | Refills: 0 | Status: SHIPPED | OUTPATIENT
Start: 2023-04-23

## 2023-04-23 RX ADMIN — KETOROLAC TROMETHAMINE 15 MG: 30 INJECTION, SOLUTION INTRAMUSCULAR; INTRAVENOUS at 03:34

## 2023-04-23 RX ADMIN — SODIUM CHLORIDE, POTASSIUM CHLORIDE, SODIUM LACTATE AND CALCIUM CHLORIDE 1000 ML: 600; 310; 30; 20 INJECTION, SOLUTION INTRAVENOUS at 03:14

## 2023-04-23 RX ADMIN — MORPHINE SULFATE 4 MG: 4 INJECTION, SOLUTION INTRAMUSCULAR; INTRAVENOUS at 03:15

## 2023-04-23 NOTE — ED PROVIDER NOTES
Subjective   History of Present Illness  32-year-old female presents with acute onset of left lower quadrant and left flank pain about an hour prior to arrival.  Patient states she had felt well prior to the onset of symptoms.  Reports that she had some urinary urgency throughout the day but otherwise had felt well.  Patient points to left inguinal region and states she had acute onset of pain at about 1 AM.  Pain has been intense and sharp since that time.  Pain radiates to left flank.  She has had some nausea but no vomiting.  No fevers or chills.  She reports that patient had viral illness consisting of nausea, vomiting, diarrhea about 1 week ago but her symptoms from this had been resolved for a few days.  No medications prior to arrival.  No aggravating relieving factors.  History of  but no other abdominal surgeries.  No vaginal bleeding or discharge.  Patient has IUD.        Review of Systems   All other systems reviewed and are negative.      Past Medical History:   Diagnosis Date   • ADHD    • Anxiety    • Asthma, exercise induced     inhaler as needed with exercise   • Cystic fibrosis carrier    • GDM (gestational diabetes mellitus), class A1 2022   • GERD (gastroesophageal reflux disease)    • Seasonal allergies        Allergies   Allergen Reactions   • Sulfa Antibiotics Hives and Nausea And Vomiting       Past Surgical History:   Procedure Laterality Date   •  SECTION N/A 2022    Procedure:  SECTION PRIMARY;  Surgeon: Bouchra Monteiro MD;  Location: [facility] LABOR DELIVERY;  Service: Obstetrics/Gynecology;  Laterality: N/A;   • ORTHOPEDIC SURGERY      cyst removal on wrist   • WISDOM TOOTH EXTRACTION         Family History   Problem Relation Age of Onset   • Hypertension Mother    • Cystic fibrosis Mother    • Breast cancer Mother         stage 0 diagnosed >50   • No Known Problems Father        Social History     Socioeconomic History   • Marital status:       Spouse name: Rickey   Tobacco Use   • Smoking status: Never   • Smokeless tobacco: Never   Vaping Use   • Vaping Use: Never used   Substance and Sexual Activity   • Alcohol use: Not Currently   • Drug use: Never   • Sexual activity: Yes     Partners: Male     Birth control/protection: None     Comment: currently pregnant           Objective   Physical Exam  Constitutional:       General: She is not in acute distress.     Appearance: She is not toxic-appearing.   HENT:      Head: Normocephalic and atraumatic.      Nose: Nose normal.      Mouth/Throat:      Mouth: Mucous membranes are moist.      Pharynx: Oropharynx is clear.   Eyes:      Extraocular Movements: Extraocular movements intact.      Pupils: Pupils are equal, round, and reactive to light.   Cardiovascular:      Rate and Rhythm: Normal rate and regular rhythm.      Pulses: Normal pulses.      Heart sounds: Normal heart sounds.   Pulmonary:      Effort: Pulmonary effort is normal. No respiratory distress.      Breath sounds: Normal breath sounds.   Abdominal:      General: There is no distension.      Palpations: Abdomen is soft.      Tenderness: There is left CVA tenderness. There is no right CVA tenderness, guarding or rebound.      Comments: Minimal left lower quadrant and left flank tenderness.   Musculoskeletal:         General: No swelling, tenderness, deformity or signs of injury. Normal range of motion.   Skin:     General: Skin is warm and dry.   Neurological:      General: No focal deficit present.      Mental Status: She is alert and oriented to person, place, and time. Mental status is at baseline.   Psychiatric:         Mood and Affect: Mood normal.         Behavior: Behavior normal.         Thought Content: Thought content normal.         Judgment: Judgment normal.         Procedures           ED Course  ED Course as of 04/23/23 0501   Sun Apr 23, 2023   0501 Patient's symptoms have resolved on reevaluation and she is resting comfortably,  heart rate has normalized.  Patient found to have kidney stone.  No UTI.  Kidney function is good.  Will prescribe Norco and Zofran.  Advised urology follow-up.  Discussed expected course and return precautions. [TD]      ED Course User Index  [TD] Luke Kaminski MD                                   [facility] reviewed by Luke Kaminski MD       [facility]    Final diagnoses:   Kidney stone       ED Disposition  ED Disposition     ED Disposition   Discharge    Condition   Stable    Comment   --             [facility]  [address] [facility] [address]  [phone]  On 4/24/2023  Call first thing Monday morning for follow up appointment         Medication List      New Prescriptions    HYDROcodone-acetaminophen 5-325 MG per tablet  Commonly known as: NORCO  Take 1 tablet by mouth Every 6 (Six) Hours As Needed for Severe Pain.     ondansetron ODT 4 MG disintegrating tablet  Commonly known as: ZOFRAN-ODT  Place 1 tablet on the tongue Every 8 (Eight) Hours As Needed for Nausea or Vomiting.           Where to Get Your Medications      These medications were sent to [facility] [address] [place] [address] [address] [facility] [phone] [facility] [phone] [address] [address]    Phone: [phone]   · HYDROcodone-acetaminophen 5-325 MG per tablet  · ondansetron ODT 4 MG disintegrating tablet          Luke Kaminski MD  04/23/23 8245

## 2023-05-04 ENCOUNTER — TELEPHONE (OUTPATIENT)
Dept: OBSTETRICS AND GYNECOLOGY | Facility: CLINIC | Age: 33
End: 2023-05-04
Payer: COMMERCIAL

## 2023-05-04 ENCOUNTER — OFFICE VISIT (OUTPATIENT)
Dept: OBSTETRICS AND GYNECOLOGY | Facility: CLINIC | Age: 33
End: 2023-05-04
Payer: COMMERCIAL

## 2023-05-04 VITALS
BODY MASS INDEX: 29.59 KG/M2 | HEIGHT: 63 IN | DIASTOLIC BLOOD PRESSURE: 78 MMHG | WEIGHT: 167 LBS | SYSTOLIC BLOOD PRESSURE: 120 MMHG

## 2023-05-04 DIAGNOSIS — Z30.431 IUD CHECK UP: ICD-10-CM

## 2023-05-04 RX ORDER — DEXTROAMPHETAMINE SACCHARATE, AMPHETAMINE ASPARTATE MONOHYDRATE, DEXTROAMPHETAMINE SULFATE AND AMPHETAMINE SULFATE 5; 5; 5; 5 MG/1; MG/1; MG/1; MG/1
25 CAPSULE, EXTENDED RELEASE ORAL
COMMUNITY

## 2023-05-04 RX ORDER — ONDANSETRON 8 MG/1
TABLET, ORALLY DISINTEGRATING ORAL
COMMUNITY
Start: 2023-04-11 | End: 2023-05-04

## 2023-05-04 RX ORDER — BETAMETHASONE DIPROPIONATE 0.5 MG/G
CREAM TOPICAL
COMMUNITY
Start: 2023-03-24 | End: 2023-05-04

## 2023-05-04 RX ORDER — ALPRAZOLAM 0.5 MG/1
TABLET ORAL
COMMUNITY

## 2023-05-04 NOTE — PROGRESS NOTES
31 yo  here today for f/u after ED. CT in ED concerning for IUD in incorrect position. Would like bilateral salpingectomy if IUD not correctly placed.         PMH:   Past Medical History:   Diagnosis Date   • ADHD    • Anxiety    • Asthma, exercise induced     inhaler as needed with exercise   • Cystic fibrosis carrier    • GDM (gestational diabetes mellitus), class A1 2022   • GERD (gastroesophageal reflux disease)    • Seasonal allergies                 PSH:     Past Surgical History:   Procedure Laterality Date   •  SECTION N/A 2022    Procedure:  SECTION PRIMARY;  Surgeon: Bouchra Monteiro MD;  Location: [facility] LABOR DELIVERY;  Service: Obstetrics/Gynecology;  Laterality: N/A;   • ORTHOPEDIC SURGERY      cyst removal on wrist   • WISDOM TOOTH EXTRACTION              POB hx: See above   PGYN hx:  STD Denies   Pap NILM    Pap screen  NILM  Co test Neg   Contraception IUD ; string in place    Menarche        Social hx:   Social History     Socioeconomic History   • Marital status:      Spouse name: Rickey   Tobacco Use   • Smoking status: Never   • Smokeless tobacco: Never   Vaping Use   • Vaping Use: Never used   Substance and Sexual Activity   • Alcohol use: Not Currently   • Drug use: Never   • Sexual activity: Yes     Partners: Male     Birth control/protection: None     Comment: currently pregnant        [  X ] no h/o abuse/DV:         [ X  ] No ETOH, tobacco, drugs  [   ] Tobacco use   [   ] Alcohol use  [   ] Drug use   Marital status:           [  X ] FOB involved     Significant family hx:   Denies                 [ X  ] No birth defects, stillbirth           Allergies:       Allergies   Allergen Reactions   • Sulfa Antibiotics Hives and Nausea And Vomiting                  Meds:   Current Outpatient Medications:   •  ALPRAZolam (XANAX) 0.5 MG tablet, alprazolam 0.5 mg tablet  TAKE 1 TABLET BY MOUTH EVERY DAY AS NEEDED, Disp: , Rfl:   •   amphetamine-dextroamphetamine XR (ADDERALL XR) 20 MG 24 hr capsule, 25 mg, Disp: , Rfl:   •  Calcium Carb-Cholecalciferol (CALCIUM 500 + D3 PO), , Disp: , Rfl:   •  cetirizine (zyrTEC) 10 MG tablet, Take 1 tablet by mouth Daily., Disp: , Rfl:   •  FLUoxetine (PROzac) 20 MG capsule, Take 1 capsule by mouth Daily., Disp: 30 capsule, Rfl: 3  •  fluticasone (FLONASE) 50 MCG/ACT nasal spray, , Disp: , Rfl:   •  ipratropium (ATROVENT) 0.06 % nasal spray, 2 sprays into the nostril(s) as directed by provider 4 (Four) Times a Day., Disp: , Rfl:   •  Misc Natural Products (FIBER SUPREME PO), Take  by mouth., Disp: , Rfl:      Review of Systems     Vitals:    05/04/23 1338   BP: 120/78        OBGyn Exam     Vitals: VSS; AF    General Appearance:  Awake. Alert. Well developed. Well nourished. In no acute distress.    Lungs:  ° Clear to auscultation bilaterally without wheezes, rales or rhonchi.  Cardiovascular:  ° System: RRR, no murmur, consistent with normal pregnancy.  Abdomen:  Visual Inspection: ° Abdomen was normal on visual inspection.  Palpation: ° Abdomen was soft. ° Abdominal non-tender.  Genitalia:  External: ° Vulva was normal. ° Labia showed no abnormalities. ° Clitoris was normal. ° [place]'s gland was normal. ° Bartholin's gland was normal. ° Genitalia exhibited no lesion.  Vagina: ° Mucosa was normal. ° Not tender. ° No vaginal mass was observed.  Cervix: ° No cervical discharge. ° Not tender. IUD strings visualized; Pap obtained   Uterus:  ° Not tender.  Uterine Adnexae: ° Normal without masses or tenderness.  Neurological:  ° Oriented to time, place, and person.  Skin:  ° General appearance was normal. No bruising or ecchymosis.    IUD Removal Procedure Note    Type of IUD:  Mirena  Date of insertion:  known  Reason for removal:  [place] uterine segment  Other relevant history/information:  none  UPT: negative    Procedure Time Out Documentation      Procedure Details  IUD strings visible:  yes  Local anesthesia:   None  Tenaculum used:  None  Removal:  IUD strings grasped and IUD removed intact with gentle traction.  The patient tolerated the procedure well.    All appropriate instructions regarding removal were reviewed.    Tolerated well  No apparent complications  Post procedure diagnosis : IUD removal     Plans for contraception:  oral contraceptives (estrogen/progesterone); planned BS ASAP     Other follow-up needed:  Pre-op today    The patient was advised to call for any fever or for prolonged or severe pain or bleeding. She was advised to use NSAID as needed for mild to moderate pain.       Luke Veras DO    5/4/2023  14:14 EDT            Diagnoses and all orders for this visit:    1. IUD check up     A/P   IUD removed  [facility] for contraception; back up for 7 days      Pre-op today   Sterilization Consult  We discussed all other forms of contraception including pills, patch, vaginal ring, injection, implant, IUD, and vasectomy. We discussed the forms of permanent sterilization including  laparoscopic tubal occlusion/partial salpingectomy, and laparoscopic salpingectomy.  We discussed a minimal increased risk of bleeding with the salpingectomy as well as the benefits including ovarian cancer risk reduction and reduced risk of ectopic pregnancy.   We discussed a risk of regret of up to 40% in women <30 years of age.  I asked her to consider if her desires would change if something happened to her current children, if she were to divorce, or if her spouse were to die unexpectedly.  We discussed that this is a permanent procedure and that she would need in vitro fertilization at the cost of up to $20,000 per cycle if she desired a pregnancy after this procedure.    We discussed that risks of surgery also include bleeding, possible need for a transfusion (with associated risks of HIV, hepatitis, and other infectious diseases), infection requiring prolonged hospitalization and treatment with antibiotics, damage to other  structures (bowel, bladder, ureters, blood vessels) requiring further surgery necessitating a larger incision to remove or repair affected organs with subsequent poor wound healing, and persistent pain.    She was also counseled that anesthesia carries its own risks and that any major surgery carries the rare risk of blood clots, pulmonary embolism, stroke, heart attack, or death. All of the patient's questions were answered to her satisfaction and she desires to proceed with surgery.  DO Luke Viveros DO    4May23  14:34 EST

## 2023-05-08 ENCOUNTER — PREP FOR SURGERY (OUTPATIENT)
Dept: OTHER | Facility: HOSPITAL | Age: 33
End: 2023-05-08
Payer: COMMERCIAL

## 2023-05-08 DIAGNOSIS — Z30.2 ENCOUNTER FOR STERILIZATION: Primary | ICD-10-CM

## 2023-05-08 RX ORDER — SODIUM CHLORIDE 9 MG/ML
40 INJECTION, SOLUTION INTRAVENOUS AS NEEDED
OUTPATIENT
Start: 2023-05-08

## 2023-05-08 RX ORDER — SODIUM CHLORIDE 0.9 % (FLUSH) 0.9 %
10 SYRINGE (ML) INJECTION AS NEEDED
OUTPATIENT
Start: 2023-05-08

## 2023-05-08 RX ORDER — SODIUM CHLORIDE 0.9 % (FLUSH) 0.9 %
10 SYRINGE (ML) INJECTION EVERY 12 HOURS SCHEDULED
OUTPATIENT
Start: 2023-05-08

## 2023-05-22 ENCOUNTER — TELEPHONE (OUTPATIENT)
Dept: OBSTETRICS AND GYNECOLOGY | Facility: CLINIC | Age: 33
End: 2023-05-22

## 2023-05-22 ENCOUNTER — OFFICE VISIT (OUTPATIENT)
Dept: OBSTETRICS AND GYNECOLOGY | Facility: CLINIC | Age: 33
End: 2023-05-22
Payer: COMMERCIAL

## 2023-05-22 VITALS
SYSTOLIC BLOOD PRESSURE: 124 MMHG | WEIGHT: 171.6 LBS | BODY MASS INDEX: 30.41 KG/M2 | HEIGHT: 63 IN | DIASTOLIC BLOOD PRESSURE: 86 MMHG

## 2023-05-22 DIAGNOSIS — Z87.42 HISTORY OF HEAVY VAGINAL BLEEDING: Primary | ICD-10-CM

## 2023-05-23 ENCOUNTER — PRE-ADMISSION TESTING (OUTPATIENT)
Dept: PREADMISSION TESTING | Facility: HOSPITAL | Age: 33
End: 2023-05-23
Payer: COMMERCIAL

## 2023-05-23 VITALS
HEART RATE: 90 BPM | OXYGEN SATURATION: 99 % | SYSTOLIC BLOOD PRESSURE: 118 MMHG | WEIGHT: 171.96 LBS | DIASTOLIC BLOOD PRESSURE: 80 MMHG | RESPIRATION RATE: 16 BRPM | BODY MASS INDEX: 30.47 KG/M2 | HEIGHT: 63 IN

## 2023-05-23 DIAGNOSIS — Z30.2 ENCOUNTER FOR STERILIZATION: ICD-10-CM

## 2023-05-23 PROBLEM — Z87.42 HISTORY OF HEAVY VAGINAL BLEEDING: Status: ACTIVE | Noted: 2023-05-23

## 2023-05-23 LAB
BASOPHILS # BLD AUTO: 0.1 10*3/MM3 (ref 0–0.2)
BASOPHILS NFR BLD AUTO: 1.5 % (ref 0–1.5)
DEPRECATED RDW RBC AUTO: 39.6 FL (ref 37–54)
EOSINOPHIL # BLD AUTO: 0.28 10*3/MM3 (ref 0–0.4)
EOSINOPHIL NFR BLD AUTO: 4.2 % (ref 0.3–6.2)
ERYTHROCYTE [DISTWIDTH] IN BLOOD BY AUTOMATED COUNT: 12.3 % (ref 12.3–15.4)
HCT VFR BLD AUTO: 42.9 % (ref 34–46.6)
HGB BLD-MCNC: 14.6 G/DL (ref 12–15.9)
IMM GRANULOCYTES # BLD AUTO: 0.03 10*3/MM3 (ref 0–0.05)
IMM GRANULOCYTES NFR BLD AUTO: 0.5 % (ref 0–0.5)
LYMPHOCYTES # BLD AUTO: 2.17 10*3/MM3 (ref 0.7–3.1)
LYMPHOCYTES NFR BLD AUTO: 32.7 % (ref 19.6–45.3)
MCH RBC QN AUTO: 29.8 PG (ref 26.6–33)
MCHC RBC AUTO-ENTMCNC: 34 G/DL (ref 31.5–35.7)
MCV RBC AUTO: 87.6 FL (ref 79–97)
MONOCYTES # BLD AUTO: 0.5 10*3/MM3 (ref 0.1–0.9)
MONOCYTES NFR BLD AUTO: 7.5 % (ref 5–12)
NEUTROPHILS NFR BLD AUTO: 3.55 10*3/MM3 (ref 1.7–7)
NEUTROPHILS NFR BLD AUTO: 53.6 % (ref 42.7–76)
NRBC BLD AUTO-RTO: 0 /100 WBC (ref 0–0.2)
PLATELET # BLD AUTO: 350 10*3/MM3 (ref 140–450)
PMV BLD AUTO: 9.5 FL (ref 6–12)
RBC # BLD AUTO: 4.9 10*6/MM3 (ref 3.77–5.28)
WBC NRBC COR # BLD: 6.63 10*3/MM3 (ref 3.4–10.8)

## 2023-05-23 PROCEDURE — 36415 COLL VENOUS BLD VENIPUNCTURE: CPT

## 2023-05-23 PROCEDURE — 85025 COMPLETE CBC W/AUTO DIFF WBC: CPT | Performed by: STUDENT IN AN ORGANIZED HEALTH CARE EDUCATION/TRAINING PROGRAM

## 2023-05-23 NOTE — PROGRESS NOTES
Was consented for BS by me and messaged to discuss adding uterine ablation for HVB. Today no change in ROS.     Last appt: 31 yo  here today for f/u after ED. CT in ED concerning for IUD in incorrect position. Would like bilateral salpingectomy if IUD not correctly placed.         PMH:   Past Medical History:   Diagnosis Date   • ADHD    • Anxiety    • Asthma, exercise induced     inhaler as needed with exercise   • Cystic fibrosis carrier    • GDM (gestational diabetes mellitus), class A1 2022   • GERD (gastroesophageal reflux disease)    • Seasonal allergies                 PSH:     Past Surgical History:   Procedure Laterality Date   •  SECTION N/A 2022    Procedure:  SECTION PRIMARY;  Surgeon: Bouchra Monteiro MD;  Location: [facility] LABOR DELIVERY;  Service: Obstetrics/Gynecology;  Laterality: N/A;   • ORTHOPEDIC SURGERY      cyst removal on wrist   • WISDOM TOOTH EXTRACTION              POB hx: See above   PGYN hx:  STD Denies   Pap NILM    Pap screen  NILM  Co test Neg   Contraception IUD ; string in place    Menarche        Social hx:   Social History     Socioeconomic History   • Marital status:      Spouse name: Rickey   Tobacco Use   • Smoking status: Never   • Smokeless tobacco: Never   Vaping Use   • Vaping Use: Never used   Substance and Sexual Activity   • Alcohol use: Not Currently   • Drug use: Never   • Sexual activity: Yes     Partners: Male     Birth control/protection: None     Comment: currently pregnant        [  X ] no h/o abuse/DV:         [ X  ] No ETOH, tobacco, drugs  [   ] Tobacco use   [   ] Alcohol use  [   ] Drug use   Marital status:           [  X ] FOB involved     Significant family hx:   Denies                 [ X  ] No birth defects, stillbirth           Allergies:       Allergies   Allergen Reactions   • Sulfa Antibiotics Hives and Nausea And Vomiting                  Meds:   Current Outpatient Medications:   •   ALPRAZolam (XANAX) 0.5 MG tablet, alprazolam 0.5 mg tablet  TAKE 1 TABLET BY MOUTH EVERY DAY AS NEEDED, Disp: , Rfl:   •  amphetamine-dextroamphetamine XR (ADDERALL XR) 20 MG 24 hr capsule, 25 mg, Disp: , Rfl:   •  cetirizine (zyrTEC) 10 MG tablet, Take 1 tablet by mouth Daily., Disp: , Rfl:   •  FLUoxetine (PROzac) 20 MG capsule, Take 1 capsule by mouth Daily., Disp: 30 capsule, Rfl: 3  •  fluticasone (FLONASE) 50 MCG/ACT nasal spray, , Disp: , Rfl:   •  ipratropium (ATROVENT) 0.06 % nasal spray, 2 sprays into the nostril(s) as directed by provider 4 (Four) Times a Day., Disp: , Rfl:   •  Misc Natural Products (FIBER SUPREME PO), Take  by mouth., Disp: , Rfl:      Review of Systems   HVB; spotting     Vitals:    05/22/23 1320   BP: 124/86        OBGyn Exam     Vitals: VSS; AF    General Appearance:  Awake. Alert. Well developed. Well nourished. In no acute distress.    Lungs:  ° Clear to auscultation bilaterally without wheezes, rales or rhonchi.  Cardiovascular:  ° System: RRR, no murmur  Abdomen:  Visual Inspection: ° Abdomen was normal on visual inspection.  Palpation: ° Abdomen was soft. ° Abdominal non-tender.  Genitalia:  Defer; performed last appt     Neurological:  ° Oriented to time, place, and person.  Skin:  ° General appearance was normal. No bruising or ecchymosis.        Luke Veras,     5/23/2023  07:37 EDT            A/P   IUD removed last appt with consent for bilateral salpingectomy.   Today we discussed Ablation, R&B and a handout was given on the procedure. She desires     Management options discussed at length today including expectant, medical, and surgical. Patient elects for an endometrial ablation. Discussed high rates of satisfaction (around 90 %) but also the potential for failure and persistent heavy or painful periods with potential need for future surgery. Patient verbalized understanding. Discussed risks of surgery including bleeding, transfusion, infection, scaring,  dyspareunia, irregular periods post op, post op pain, uterine perforation and potential need for surgical correction, inadvertent injury to GI/ structures and potential need for surgical correction, DVT, anesthesia complications, organ failure, nerve damage, and death. Discussed preop process and typicaly post op/recovery. Pt advised to avoid aspirin for 7 days preop. Discussed potential need for post op pain meds.       CHC for contraception; back up for 7 days      Pre-op today        Luke Veras DO    [id number]  14:34 EST

## 2023-05-23 NOTE — PAT
Pt here for PAT visit.  Pre-op tests completed, chg soap given, and instructions reviewed.  Instructed clears until 2 hrs prior to arrival time, voiced understanding.  Pt states an ablation is being added to procedure, office notified

## 2023-05-23 NOTE — H&P (VIEW-ONLY)
Was consented for BS by me and messaged to discuss adding uterine ablation for HVB. Today no change in ROS.     Last appt: 31 yo  here today for f/u after ED. CT in ED concerning for IUD in incorrect position. Would like bilateral salpingectomy if IUD not correctly placed.         PMH:   Past Medical History:   Diagnosis Date   • ADHD    • Anxiety    • Asthma, exercise induced     inhaler as needed with exercise   • Cystic fibrosis carrier    • GDM (gestational diabetes mellitus), class A1 2022   • GERD (gastroesophageal reflux disease)    • Seasonal allergies                 PSH:     Past Surgical History:   Procedure Laterality Date   •  SECTION N/A 2022    Procedure:  SECTION PRIMARY;  Surgeon: Bouchra Monteiro MD;  Location: [facility] LABOR DELIVERY;  Service: Obstetrics/Gynecology;  Laterality: N/A;   • ORTHOPEDIC SURGERY      cyst removal on wrist   • WISDOM TOOTH EXTRACTION              POB hx: See above   PGYN hx:  STD Denies   Pap NILM    Pap screen  NILM  Co test Neg   Contraception IUD ; string in place    Menarche        Social hx:   Social History     Socioeconomic History   • Marital status:      Spouse name: Rickey   Tobacco Use   • Smoking status: Never   • Smokeless tobacco: Never   Vaping Use   • Vaping Use: Never used   Substance and Sexual Activity   • Alcohol use: Not Currently   • Drug use: Never   • Sexual activity: Yes     Partners: Male     Birth control/protection: None     Comment: currently pregnant        [  X ] no h/o abuse/DV:         [ X  ] No ETOH, tobacco, drugs  [   ] Tobacco use   [   ] Alcohol use  [   ] Drug use   Marital status:           [  X ] FOB involved     Significant family hx:   Denies                 [ X  ] No birth defects, stillbirth           Allergies:       Allergies   Allergen Reactions   • Sulfa Antibiotics Hives and Nausea And Vomiting                  Meds:   Current Outpatient Medications:   •   ALPRAZolam (XANAX) 0.5 MG tablet, alprazolam 0.5 mg tablet  TAKE 1 TABLET BY MOUTH EVERY DAY AS NEEDED, Disp: , Rfl:   •  amphetamine-dextroamphetamine XR (ADDERALL XR) 20 MG 24 hr capsule, 25 mg, Disp: , Rfl:   •  cetirizine (zyrTEC) 10 MG tablet, Take 1 tablet by mouth Daily., Disp: , Rfl:   •  FLUoxetine (PROzac) 20 MG capsule, Take 1 capsule by mouth Daily., Disp: 30 capsule, Rfl: 3  •  fluticasone (FLONASE) 50 MCG/ACT nasal spray, , Disp: , Rfl:   •  ipratropium (ATROVENT) 0.06 % nasal spray, 2 sprays into the nostril(s) as directed by provider 4 (Four) Times a Day., Disp: , Rfl:   •  Misc Natural Products (FIBER SUPREME PO), Take  by mouth., Disp: , Rfl:      Review of Systems   HVB; spotting     Vitals:    05/22/23 1320   BP: 124/86        OBGyn Exam     Vitals: VSS; AF    General Appearance:  Awake. Alert. Well developed. Well nourished. In no acute distress.    Lungs:  ° Clear to auscultation bilaterally without wheezes, rales or rhonchi.  Cardiovascular:  ° System: RRR, no murmur  Abdomen:  Visual Inspection: ° Abdomen was normal on visual inspection.  Palpation: ° Abdomen was soft. ° Abdominal non-tender.  Genitalia:  Defer; performed last appt     Neurological:  ° Oriented to time, place, and person.  Skin:  ° General appearance was normal. No bruising or ecchymosis.        Luke Veras,     5/23/2023  07:37 EDT            A/P   IUD removed last appt with consent for bilateral salpingectomy.   Today we discussed Ablation, R&B and a handout was given on the procedure. She desires     Management options discussed at length today including expectant, medical, and surgical. Patient elects for an endometrial ablation. Discussed high rates of satisfaction (around 90 %) but also the potential for failure and persistent heavy or painful periods with potential need for future surgery. Patient verbalized understanding. Discussed risks of surgery including bleeding, transfusion, infection, scaring,  dyspareunia, irregular periods post op, post op pain, uterine perforation and potential need for surgical correction, inadvertent injury to GI/ structures and potential need for surgical correction, DVT, anesthesia complications, organ failure, nerve damage, and death. Discussed preop process and typicaly post op/recovery. Pt advised to avoid aspirin for 7 days preop. Discussed potential need for post op pain meds.       CHC for contraception; back up for 7 days      Pre-op today        Luke Veras DO    [id number]  14:34 EST

## 2023-05-23 NOTE — DISCHARGE INSTRUCTIONS
PRE-ADMISSION TESTING INSTRUCTIONS FOR ADULTS    Take these medications the morning of surgery with a small sip of water: Prozac      Do not take any insulin or diabetes medications the morning of surgery.      No aspirin, advil, aleve, ibuprofen, naproxen, diet pills, decongestants, or herbal/vitamins for a week prior to surgery.       Tylenol/Acetaminophen is okay to take if needed.    General Instructions:    DO NOT EAT SOLID FOOD AFTER MIDNIGHT THE NIGHT BEFORE SURGERY. No gum, mints, or hard candy after midnight the night before surgery.  You may drink clear liquids the day of surgery up until 2 hours before your arrival time.  Clear liquids are liquids you can see through. Nothing RED in color.    Plain water    Sports drinks      Gelatin (Jell-O)  Fruit juices without pulp such as white grape juice and apple juice  Popsicles that contain no fruit or yogurt  Tea or coffee (no cream or milk added)    It is beneficial for you to have a clear drink that contains carbohydrates 2 hours before your arrival time.  We suggest a 20 ounce bottle of Gatorade or Powerade for non-diabetic patients or a 20 ounce bottle of Gatorade Zero or Powerade Zero for diabetic patients.     Patients who avoid smoking, chewing tobacco and alcohol for 4 weeks prior to surgery have a reduced risk of post-operative complications.  If at all possible, quit smoking as many days before surgery as you can.    Do not smoke, use chewing tobacco or drink alcohol the day of surgery    Bring your C-PAP/ BI-PAP machine if you use one.  Wear clean comfortable clothes.  Do not wear contact lenses, lotion, deodorant, or make-up.  Bring a case for your glasses if applicable. You may brush your teeth the morning of surgery.  You may wear dentures/partials, do not put adhesive/glue on them.  Leave all other jewelry and valuables at home.      Preventing a Surgical Site Infection:    Shower the night before and on the morning of surgery using the  chlorhexidine soap you were given.  Use a clean washcloth with the soap.  Place clean sheets on your bed after showering the night before surgery. Do not use the CHG soap on your hair, face, or private areas. Wash your body gently for five (5) minutes. Do not scrub your skin.  Dry with a clean towel and dress in clean clothing.  Do not shave the surgical area for 10 days-2 weeks prior to surgery  because the razor can irritate skin and make it easier to develop an infection.  Make sure you, your family, and all healthcare providers clean their hands with soap and water or an alcohol based hand  before caring for you or your wound.      Day of surgery:    Your surgeon’s office will advise you of your arrival time for the day of surgery.    Upon arrival, a Pre-op nurse and Anesthesia provider will review your health history, obtain vital signs, and answer questions you may have. The anesthesia provider will also discuss the type of anesthesia that will be needed for your procedure, which may include general anesthesia. The only belongings needed at this time will be your home medications and if applicable your C-PAP/BI-PAP machine.  If you are staying overnight your family can leave the rest of your belongings in the car and bring them to your room later.  A Pre-op nurse will start an IV and you may receive medication in preparation for surgery, including something to help you relax.  Your family will be able to see you in the Pre-op area.  While you are in surgery your family should notify the waiting room  if they leave the waiting room area and provide a contact phone number.    IF you have any questions, you can call the Pre-Admission Department at [phone] or your surgeon's office.  Notify your surgeon if  you become sick, have a fever, productive cough, or cannot be here the day of surgery    Please be aware that surgery does come with discomfort.  We want to make every effort to  control your discomfort so please discuss any uncontrolled symptoms with your nurse.   Your doctor will most likely have prescribed pain medications.      If you are going home after surgery, you will receive individualized written care instructions before being discharged.  A responsible adult (over the age of 18) must drive you to and from the hospital on the day of your surgery and stay with you for 24 hours after anesthesia.    If you are staying overnight following surgery, you will be transported to your hospital room following the recovery period.  [facility] has all private rooms.    You may receive a survey regarding the care you received. Your feedback is very important and will be used to collect the necessary data to help us to continue to provide excellent care.     Deductibles and co-payments are collected on the day of service. Please be prepared to pay the required co-pay, deductible or deposit on the day of service as defined by your plan.

## 2023-05-24 ENCOUNTER — TELEPHONE (OUTPATIENT)
Dept: OBSTETRICS AND GYNECOLOGY | Facility: CLINIC | Age: 33
End: 2023-05-24
Payer: COMMERCIAL

## 2023-05-30 ENCOUNTER — ANESTHESIA EVENT (OUTPATIENT)
Dept: PERIOP | Facility: HOSPITAL | Age: 33
End: 2023-05-30
Payer: COMMERCIAL

## 2023-05-31 ENCOUNTER — HOSPITAL ENCOUNTER (OUTPATIENT)
Facility: HOSPITAL | Age: 33
Discharge: HOME | End: 2023-05-31
Attending: STUDENT IN AN ORGANIZED HEALTH CARE EDUCATION/TRAINING PROGRAM | Admitting: STUDENT IN AN ORGANIZED HEALTH CARE EDUCATION/TRAINING PROGRAM
Payer: COMMERCIAL

## 2023-05-31 ENCOUNTER — ANESTHESIA (OUTPATIENT)
Dept: PERIOP | Facility: HOSPITAL | Age: 33
End: 2023-05-31
Payer: COMMERCIAL

## 2023-05-31 VITALS
TEMPERATURE: 98 F | DIASTOLIC BLOOD PRESSURE: 81 MMHG | HEART RATE: 58 BPM | RESPIRATION RATE: 16 BRPM | WEIGHT: 172.4 LBS | OXYGEN SATURATION: 99 % | BODY MASS INDEX: 30.54 KG/M2 | SYSTOLIC BLOOD PRESSURE: 114 MMHG

## 2023-05-31 DIAGNOSIS — Z30.2 ENCOUNTER FOR STERILIZATION: ICD-10-CM

## 2023-05-31 LAB — HCG SERPL QL: NEGATIVE

## 2023-05-31 PROCEDURE — 58661 LAPAROSCOPY REMOVE ADNEXA: CPT | Performed by: STUDENT IN AN ORGANIZED HEALTH CARE EDUCATION/TRAINING PROGRAM

## 2023-05-31 PROCEDURE — 25010000002 MIDAZOLAM PER 1MG: Performed by: NURSE ANESTHETIST, CERTIFIED REGISTERED

## 2023-05-31 PROCEDURE — 88304 TISSUE EXAM BY PATHOLOGIST: CPT | Performed by: STUDENT IN AN ORGANIZED HEALTH CARE EDUCATION/TRAINING PROGRAM

## 2023-05-31 PROCEDURE — 25010000002 DEXAMETHASONE PER 1 MG: Performed by: NURSE ANESTHETIST, CERTIFIED REGISTERED

## 2023-05-31 PROCEDURE — 88302 TISSUE EXAM BY PATHOLOGIST: CPT | Performed by: STUDENT IN AN ORGANIZED HEALTH CARE EDUCATION/TRAINING PROGRAM

## 2023-05-31 PROCEDURE — 58563 HYSTEROSCOPY ABLATION: CPT | Performed by: STUDENT IN AN ORGANIZED HEALTH CARE EDUCATION/TRAINING PROGRAM

## 2023-05-31 PROCEDURE — 25010000002 KETOROLAC TROMETHAMINE PER 15 MG: Performed by: NURSE ANESTHETIST, CERTIFIED REGISTERED

## 2023-05-31 PROCEDURE — 25010000002 SUCCINYLCHOLINE PER 20 MG: Performed by: NURSE ANESTHETIST, CERTIFIED REGISTERED

## 2023-05-31 PROCEDURE — 25010000002 ONDANSETRON PER 1 MG: Performed by: NURSE ANESTHETIST, CERTIFIED REGISTERED

## 2023-05-31 PROCEDURE — 84703 CHORIONIC GONADOTROPIN ASSAY: CPT | Performed by: STUDENT IN AN ORGANIZED HEALTH CARE EDUCATION/TRAINING PROGRAM

## 2023-05-31 PROCEDURE — 25010000002 NEOSTIGMINE 10 MG/10ML SOLUTION: Performed by: NURSE ANESTHETIST, CERTIFIED REGISTERED

## 2023-05-31 PROCEDURE — 25010000002 FENTANYL CITRATE (PF) 50 MCG/ML SOLUTION: Performed by: NURSE ANESTHETIST, CERTIFIED REGISTERED

## 2023-05-31 PROCEDURE — 25010000002 PROPOFOL 200 MG/20ML EMULSION: Performed by: NURSE ANESTHETIST, CERTIFIED REGISTERED

## 2023-05-31 RX ORDER — DIPHENOXYLATE HYDROCHLORIDE AND ATROPINE SULFATE 2.5; .025 MG/1; MG/1
TABLET ORAL DAILY
COMMUNITY

## 2023-05-31 RX ORDER — SODIUM CHLORIDE 0.9 % (FLUSH) 0.9 %
10 SYRINGE (ML) INJECTION EVERY 12 HOURS SCHEDULED
Status: DISCONTINUED | OUTPATIENT
Start: 2023-05-31 | End: 2023-05-31 | Stop reason: HOSPADM

## 2023-05-31 RX ORDER — SUCCINYLCHOLINE CHLORIDE 20 MG/ML
INJECTION INTRAMUSCULAR; INTRAVENOUS AS NEEDED
Status: DISCONTINUED | OUTPATIENT
Start: 2023-05-31 | End: 2023-05-31 | Stop reason: SURG

## 2023-05-31 RX ORDER — SODIUM CHLORIDE 0.9 % (FLUSH) 0.9 %
10 SYRINGE (ML) INJECTION AS NEEDED
Status: DISCONTINUED | OUTPATIENT
Start: 2023-05-31 | End: 2023-05-31 | Stop reason: HOSPADM

## 2023-05-31 RX ORDER — DOCUSATE SODIUM 100 MG/1
100 CAPSULE, LIQUID FILLED ORAL 2 TIMES DAILY
Qty: 60 CAPSULE | Refills: 1 | Status: SHIPPED | OUTPATIENT
Start: 2023-05-31

## 2023-05-31 RX ORDER — KETOROLAC TROMETHAMINE 30 MG/ML
INJECTION, SOLUTION INTRAMUSCULAR; INTRAVENOUS AS NEEDED
Status: DISCONTINUED | OUTPATIENT
Start: 2023-05-31 | End: 2023-05-31 | Stop reason: SURG

## 2023-05-31 RX ORDER — SODIUM CHLORIDE 9 MG/ML
INJECTION, SOLUTION INTRAVENOUS AS NEEDED
Status: DISCONTINUED | OUTPATIENT
Start: 2023-05-31 | End: 2023-05-31 | Stop reason: HOSPADM

## 2023-05-31 RX ORDER — SODIUM CHLORIDE 9 MG/ML
40 INJECTION, SOLUTION INTRAVENOUS AS NEEDED
Status: DISCONTINUED | OUTPATIENT
Start: 2023-05-31 | End: 2023-05-31 | Stop reason: HOSPADM

## 2023-05-31 RX ORDER — FENTANYL CITRATE 50 UG/ML
INJECTION, SOLUTION INTRAMUSCULAR; INTRAVENOUS AS NEEDED
Status: DISCONTINUED | OUTPATIENT
Start: 2023-05-31 | End: 2023-05-31 | Stop reason: SURG

## 2023-05-31 RX ORDER — SODIUM CHLORIDE, SODIUM LACTATE, POTASSIUM CHLORIDE, CALCIUM CHLORIDE 600; 310; 30; 20 MG/100ML; MG/100ML; MG/100ML; MG/100ML
9 INJECTION, SOLUTION INTRAVENOUS CONTINUOUS PRN
Status: DISCONTINUED | OUTPATIENT
Start: 2023-05-31 | End: 2023-05-31 | Stop reason: HOSPADM

## 2023-05-31 RX ORDER — ROCURONIUM BROMIDE 10 MG/ML
INJECTION, SOLUTION INTRAVENOUS AS NEEDED
Status: DISCONTINUED | OUTPATIENT
Start: 2023-05-31 | End: 2023-05-31 | Stop reason: SURG

## 2023-05-31 RX ORDER — OXYCODONE HYDROCHLORIDE AND ACETAMINOPHEN 5; 325 MG/1; MG/1
1 TABLET ORAL EVERY 4 HOURS PRN
Qty: 10 TABLET | Refills: 0 | Status: SHIPPED | OUTPATIENT
Start: 2023-05-31

## 2023-05-31 RX ORDER — DEXMEDETOMIDINE HYDROCHLORIDE 100 UG/ML
INJECTION, SOLUTION INTRAVENOUS AS NEEDED
Status: DISCONTINUED | OUTPATIENT
Start: 2023-05-31 | End: 2023-05-31 | Stop reason: SURG

## 2023-05-31 RX ORDER — ONDANSETRON 2 MG/ML
4 INJECTION INTRAMUSCULAR; INTRAVENOUS ONCE AS NEEDED
Status: COMPLETED | OUTPATIENT
Start: 2023-05-31 | End: 2023-05-31

## 2023-05-31 RX ORDER — NEOSTIGMINE METHYLSULFATE 1 MG/ML
INJECTION, SOLUTION INTRAVENOUS AS NEEDED
Status: DISCONTINUED | OUTPATIENT
Start: 2023-05-31 | End: 2023-05-31 | Stop reason: SURG

## 2023-05-31 RX ORDER — PROPOFOL 10 MG/ML
INJECTION, EMULSION INTRAVENOUS AS NEEDED
Status: DISCONTINUED | OUTPATIENT
Start: 2023-05-31 | End: 2023-05-31 | Stop reason: SURG

## 2023-05-31 RX ORDER — BUPIVACAINE HYDROCHLORIDE AND EPINEPHRINE 2.5; 5 MG/ML; UG/ML
INJECTION, SOLUTION INFILTRATION; PERINEURAL AS NEEDED
Status: DISCONTINUED | OUTPATIENT
Start: 2023-05-31 | End: 2023-05-31 | Stop reason: HOSPADM

## 2023-05-31 RX ORDER — IBUPROFEN 800 MG/1
800 TABLET ORAL EVERY 8 HOURS PRN
Qty: 30 TABLET | Refills: 0 | Status: SHIPPED | OUTPATIENT
Start: 2023-05-31

## 2023-05-31 RX ORDER — GLYCOPYRROLATE 0.2 MG/ML
INJECTION INTRAMUSCULAR; INTRAVENOUS AS NEEDED
Status: DISCONTINUED | OUTPATIENT
Start: 2023-05-31 | End: 2023-05-31 | Stop reason: SURG

## 2023-05-31 RX ORDER — MIDAZOLAM HYDROCHLORIDE 2 MG/2ML
1 INJECTION, SOLUTION INTRAMUSCULAR; INTRAVENOUS
Status: DISCONTINUED | OUTPATIENT
Start: 2023-05-31 | End: 2023-05-31 | Stop reason: HOSPADM

## 2023-05-31 RX ORDER — LIDOCAINE HYDROCHLORIDE 10 MG/ML
0.5 INJECTION, SOLUTION EPIDURAL; INFILTRATION; INTRACAUDAL; PERINEURAL ONCE AS NEEDED
Status: DISCONTINUED | OUTPATIENT
Start: 2023-05-31 | End: 2023-05-31 | Stop reason: HOSPADM

## 2023-05-31 RX ORDER — OXYCODONE HYDROCHLORIDE AND ACETAMINOPHEN 5; 325 MG/1; MG/1
1 TABLET ORAL ONCE AS NEEDED
Status: COMPLETED | OUTPATIENT
Start: 2023-05-31 | End: 2023-05-31

## 2023-05-31 RX ORDER — FAMOTIDINE 10 MG/ML
20 INJECTION, SOLUTION INTRAVENOUS
Status: COMPLETED | OUTPATIENT
Start: 2023-05-31 | End: 2023-05-31

## 2023-05-31 RX ORDER — ONDANSETRON 2 MG/ML
4 INJECTION INTRAMUSCULAR; INTRAVENOUS ONCE AS NEEDED
Status: DISCONTINUED | OUTPATIENT
Start: 2023-05-31 | End: 2023-05-31 | Stop reason: HOSPADM

## 2023-05-31 RX ORDER — KETAMINE HYDROCHLORIDE 10 MG/ML
INJECTION INTRAMUSCULAR; INTRAVENOUS AS NEEDED
Status: DISCONTINUED | OUTPATIENT
Start: 2023-05-31 | End: 2023-05-31 | Stop reason: SURG

## 2023-05-31 RX ORDER — LIDOCAINE HYDROCHLORIDE 20 MG/ML
INJECTION, SOLUTION INFILTRATION; PERINEURAL AS NEEDED
Status: DISCONTINUED | OUTPATIENT
Start: 2023-05-31 | End: 2023-05-31 | Stop reason: SURG

## 2023-05-31 RX ORDER — SODIUM CHLORIDE, SODIUM LACTATE, POTASSIUM CHLORIDE, CALCIUM CHLORIDE 600; 310; 30; 20 MG/100ML; MG/100ML; MG/100ML; MG/100ML
100 INJECTION, SOLUTION INTRAVENOUS CONTINUOUS
Status: DISCONTINUED | OUTPATIENT
Start: 2023-05-31 | End: 2023-05-31 | Stop reason: HOSPADM

## 2023-05-31 RX ORDER — DEXAMETHASONE SODIUM PHOSPHATE 4 MG/ML
8 INJECTION, SOLUTION INTRA-ARTICULAR; INTRALESIONAL; INTRAMUSCULAR; INTRAVENOUS; SOFT TISSUE ONCE AS NEEDED
Status: COMPLETED | OUTPATIENT
Start: 2023-05-31 | End: 2023-05-31

## 2023-05-31 RX ADMIN — SUCCINYLCHOLINE CHLORIDE 120 MG: 20 INJECTION, SOLUTION INTRAMUSCULAR; INTRAVENOUS at 08:18

## 2023-05-31 RX ADMIN — SODIUM CHLORIDE, POTASSIUM CHLORIDE, SODIUM LACTATE AND CALCIUM CHLORIDE 9 ML/HR: 600; 310; 30; 20 INJECTION, SOLUTION INTRAVENOUS at 07:24

## 2023-05-31 RX ADMIN — OXYCODONE HYDROCHLORIDE AND ACETAMINOPHEN 1 TABLET: 5; 325 TABLET ORAL at 09:53

## 2023-05-31 RX ADMIN — LIDOCAINE HYDROCHLORIDE 100 MG: 20 INJECTION, SOLUTION INFILTRATION; PERINEURAL at 08:18

## 2023-05-31 RX ADMIN — DEXMEDETOMIDINE 4 MCG: 100 INJECTION, SOLUTION, CONCENTRATE INTRAVENOUS at 08:50

## 2023-05-31 RX ADMIN — ONDANSETRON 4 MG: 2 INJECTION INTRAMUSCULAR; INTRAVENOUS at 07:25

## 2023-05-31 RX ADMIN — KETAMINE HYDROCHLORIDE 10 MG: 10 INJECTION INTRAMUSCULAR; INTRAVENOUS at 08:50

## 2023-05-31 RX ADMIN — PROPOFOL INJECTABLE EMULSION 200 MG: 10 INJECTION, EMULSION INTRAVENOUS at 08:18

## 2023-05-31 RX ADMIN — DEXAMETHASONE SODIUM PHOSPHATE 8 MG: 4 INJECTION, SOLUTION INTRAMUSCULAR; INTRAVENOUS at 07:24

## 2023-05-31 RX ADMIN — DEXMEDETOMIDINE 8 MCG: 100 INJECTION, SOLUTION, CONCENTRATE INTRAVENOUS at 08:28

## 2023-05-31 RX ADMIN — KETOROLAC TROMETHAMINE 30 MG: 30 INJECTION, SOLUTION INTRAMUSCULAR; INTRAVENOUS at 08:50

## 2023-05-31 RX ADMIN — FENTANYL CITRATE 50 MCG: 50 INJECTION, SOLUTION INTRAMUSCULAR; INTRAVENOUS at 08:49

## 2023-05-31 RX ADMIN — MIDAZOLAM HYDROCHLORIDE 1 MG: 1 INJECTION, SOLUTION INTRAMUSCULAR; INTRAVENOUS at 07:29

## 2023-05-31 RX ADMIN — GLYCOPYRROLATE 0.4 MG: 0.2 INJECTION INTRAMUSCULAR; INTRAVENOUS at 09:01

## 2023-05-31 RX ADMIN — ROCURONIUM BROMIDE 20 MG: 10 INJECTION INTRAVENOUS at 08:29

## 2023-05-31 RX ADMIN — NEOSTIGMINE METHYLSULFATE 4 MG: 0.5 INJECTION INTRAVENOUS at 09:01

## 2023-05-31 RX ADMIN — FAMOTIDINE 20 MG: 10 INJECTION, SOLUTION INTRAVENOUS at 07:24

## 2023-05-31 RX ADMIN — KETAMINE HYDROCHLORIDE 20 MG: 10 INJECTION INTRAMUSCULAR; INTRAVENOUS at 08:27

## 2023-05-31 NOTE — INTERVAL H&P NOTE
H&P reviewed. The patient was examined and there are no changes to the H&P.    Vitals:    05/31/23 0629 05/31/23 0650   BP:  117/80   BP Location:  Right arm   Patient Position:  Lying   Pulse:  75   Resp:  17   Temp: 98 °F (36.7 °C)    TempSrc: Oral    SpO2:  97%   Weight: 78.2 kg (172 lb 6.4 oz)

## 2023-05-31 NOTE — OP NOTE
Subjective     Date of Service:  05/31/23  Time of Service:  09:29 EDT    Surgical Staff: Surgeon(s) and Role:     * Luke Veras,  - Primary   Additional Staff: Brayan Alfaro CFA. Assistant: Braayn Alfaro CSA was responsible for performing the following activities: Retraction, Suturing, Closing and Placing Dressing and their skilled assistance was necessary for the success of this case. , @[email or web address] [email or web address]@   Pre-operative diagnosis(es): Pre-Op Diagnosis Codes:     * Encounter for sterilization [Z30.2]     Post-operative diagnosis(es): Post-Op Diagnosis Codes:     * Encounter for sterilization [Z30.2]   Procedure(s): Procedure(s):  Bilateral SALPINGECTOMY LAPAROSCOPIC  Uterine ablation ( Novasure)  EXCISION  SKIN TAG UMBILICUS     Antibiotics: None indicated ordered on call to OR     Anesthesia: Type: Choice  ASA:  II     Objective      Operative findings: Normal appearing uterus , fallopian tubes and ovaries. Hysteroscopy post ablation consistent with Novasure ablation.   Mole on umbilicus at trocar placement; removed as concern would heal into scar.    Specimens removed: ID Type Source Tests Collected by Time   A (Not marked as sent) :  Tissue Fallopian Tube, Left TISSUE PATHOLOGY EXAM Luke Veras, DO 5/31/2023 0844   B (Not marked as sent) :  Tissue Fallopian Tube, Right TISSUE PATHOLOGY EXAM Luke Veras, DO 5/31/2023 0844   C (Not marked as sent) : SKIN TAG Tissue Umbilicus TISSUE PATHOLOGY EXAM Luke Veras, DO 5/31/2023 0849      Fluid Intake: 700mL   Output: Documented Output  Est. Blood Loss <10mL  Urine Output 50mL      I/O this shift:  In: 700 [I.V.:700]  Out: 60 [Urine:50; Blood:10]     Blood products used: No   Drains: * No LDAs found *   Implant Information: Nothing was implanted during the procedure   Complications: None apparent   Intraoperative consult(s): none   Condition: stable   Disposition: to PACU and then admit to home         Assessment & Plan     The patient was  taken to the operating room with intravenous fluids running and sequential compression devices in place.  General anesthesia was induced without difficulty. She was then prepped and draped in the normal sterile fashion in the dorsal lithotomy position.  Bladder drained. A surgical time out was performed using two patient identifiers.    Attention was then turned to the abdomen.    The abdomen was grasped  and a 5mm vertical incision was made in the umbilicus. A 5 millimeter trocar was advanced into the abdomen under direct visualization. Once intraperitoneal placement was confirmed the abdomen was insufflated with carbon dioxide gas.  Initial survey revealed no injury from the placement of the trocar. Additional 5millimeter ports were placed in the left and right lower quadrants without difficulty under direct visualization with no apparent injury.   Survey of the pelvis revealed normal the findings as noted above.        The right fallopian tube was grasped at the fimbriated end and the Enseal electrosurgical device was used to coagulate and transect the mesosalpinx.  At the cornua the fallopian tube was transected.  The specimen was removed through the port without difficulty and the mesosalpinx was hemostastic upon inspection.  In a similar manner, the left fallopian tube was excised and removed from the abdomen.  The left and right lower quadrant ports were removed under direct visualization with good hemostasis noted.     Procedure: Uterine ablation aspect of case   Under general anaesthetic in a dorsal lithotomy position, the patient was prepped and draped in the usual sterile manner. Bimanual exam prior to prepping revealed a mobile, anteverted non-enlarged uterus. A  speculum was placed in the vagina and with the help of a right angle retractor the anterior lip of the cervix was grasped with a single toothed tinaculum and brought forward. Taking care not to enter deep into the uterus, a sound was passed  inside to measure the length of the uterus and cervix.  Next, uterine sound was inserted into the cervical os to measure the cervical length which was 7 cm. This yielded an endometrial cavity length of 4. A series of Hegar dilators were then inserted sequentially into the cervical os up to a size of 5 mm.  The novosure device was then opened and tested; the fan deployed easily. The instrument was set to the correct cavity length ( 4.0cm) and introduced into the uterine cavity. The fan was slowly deployed with gentle movements to ensure a snug fit within the cavity. The cavity width read 2.5cm . The measurements were imported and a cavity check was done. The trumpet was then slid down to the cervix and the device was activated. The total burn time was 89 seconds and the power was 55W. The fan was retracted and device removed. The fan was examined and revealed charred tissue. The tenaculum was removed and the cervix examined for hemostasis which was achieved. Hysteroscope introduced; picture taken. At the end of the procedure all sponges and instruments were counted and correct. The blood loss was minimal and there were no complications.      The abdomen was desufflated and the umblical trocar was removed without difficulty. The port sites were closed with a subcuticular stitch of 4-0 monocryl and covered with surgical glue.     Mole noted at umbilical trocar site. Removed as I had concern it would heal into scar and disfigure pt. Sent to pathology.     The patient was awakened from general anesthesia without difficulty and taken to the recovery room in stable condition. All counts were correct and no complications were noted.     Luke Veras DO

## 2023-05-31 NOTE — ANESTHESIA POSTPROCEDURE EVALUATION
Patient: Bernardo Tapia    Procedure Summary     Date: 05/31/23 Room / Location:  [facility] OR 2 /  [facility]    Anesthesia Start: 0812 Anesthesia Stop: 0916    Procedures:       Bilateral SALPINGECTOMY LAPAROSCOPIC (Bilateral: Abdomen)      Uterine ablation ( Novasure) (Vagina)      EXCISION  SKIN TAG UMBILICUS Diagnosis:       Encounter for sterilization      (Encounter for sterilization [Z30.2])    Surgeons: Luke Veras DO Provider: Darlene Leiva CRNA    Anesthesia Type: general ASA Status: 2          Anesthesia Type: general    Vitals  Vitals Value Taken Time   /85 05/31/23 0955   Temp 97.7 °F (36.5 °C) 05/31/23 0916   Pulse 67 05/31/23 0956   Resp 14 05/31/23 0955   SpO2 100 % 05/31/23 0956   Vitals shown include unvalidated device data.        Post Anesthesia Care and Evaluation    Patient location during evaluation: PHASE II  Patient participation: complete - patient participated  Level of consciousness: awake  Pain management: adequate  Anesthetic complications: No anesthetic complications  PONV Status: none  Cardiovascular status: acceptable  Respiratory status: acceptable  Hydration status: acceptable

## 2023-05-31 NOTE — ANESTHESIA PROCEDURE NOTES
Airway  Urgency: elective    Date/Time: 5/31/2023 8:22 AM  Airway not difficult    General Information and Staff    Patient location during procedure: OR  CRNA/CAA: Darlene Leiva, REJI    Indications and Patient Condition  Indications for airway management: airway protection    Preoxygenated: yes  MILS maintained throughout  Mask difficulty assessment: 1 - vent by mask    Final Airway Details  Final airway type: endotracheal airway      Successful airway: ETT  Cuffed: yes   Successful intubation technique: direct laryngoscopy  Facilitating devices/methods: intubating stylet  Endotracheal tube insertion site: oral  Blade: Reyes  Blade size: 2  ETT size (mm): 7.5  Cormack-Lehane Classification: grade I - full view of glottis  Placement verified by: chest auscultation and capnometry   Cuff volume (mL): 8  Measured from: lips  ETT/EBT  to lips (cm): 22  Number of attempts at approach: 1  Assessment: lips, teeth, and gum same as pre-op and atraumatic intubation

## 2023-05-31 NOTE — ANESTHESIA PREPROCEDURE EVALUATION
Anesthesia Evaluation     Patient summary reviewed and Nursing notes reviewed   NPO Solid Status: > 8 hours  NPO Liquid Status: > 6 hours           Airway   Mallampati: II  TM distance: >3 FB  Neck ROM: full  No difficulty expected  Dental - normal exam     Pulmonary - normal exam    breath sounds clear to auscultation  (+) asthma (Several years ago. Was a runner.),  Cardiovascular - negative cardio ROS and normal exam  Exercise tolerance: good (4-7 METS)    Rhythm: regular  Rate: normal        Neuro/Psych  (+) psychiatric history Anxiety and ADHD,    GI/Hepatic/Renal/Endo    (+)  GERD,  diabetes mellitus (Resolved since pregnancy.) gestational,     Musculoskeletal     Abdominal  - normal exam    Abdomen: soft.  Bowel sounds: normal.   Substance History   (+) alcohol use (0nce or twice a week. Social drinker. 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              Allergies   Allergen Reactions   • Sulfa Antibiotics Hives and Nausea And Vomiting     Past Medical History:   Diagnosis Date   • ADHD    • Anxiety    • Asthma, exercise induced     inhaler as needed with exercise   • Cystic fibrosis carrier    • GDM (gestational diabetes mellitus), class A1 2022   • GERD (gastroesophageal reflux disease)    • Seasonal allergies      Past Surgical History:   Procedure Laterality Date   •  SECTION N/A 2022    Procedure:  SECTION PRIMARY;  Surgeon: Bouchra Monteiro MD;  Location: [facility] LABOR DELIVERY;  Service: Obstetrics/Gynecology;  Laterality: N/A;   • ORTHOPEDIC SURGERY      cyst removal on wrist   • WISDOM TOOTH EXTRACTION       OB History    Para Term  AB Living   2 1 1   1 1   SAB IAB Ectopic Molar Multiple Live Births   1       0 1      # Outcome Date GA Lbr Tray/2nd Weight Sex Delivery Anes PTL Lv   2 Term 22 40w1d  3204 g (7 lb 1 oz) F CS-LTranv EPI N TAM      Complications:  Failure to Progress in First Stage   1 SAB 10/2021 6w0d    SAB                   Anesthesia Plan    ASA 2     general     intravenous induction     Anesthetic plan, risks, benefits, and alternatives have been provided, discussed and informed consent has been obtained with: patient.  Pre-procedure education provided  Use of blood products discussed with patient  Consented to blood products.   Plan discussed with CRNA and Surgeon.        CODE STATUS:    Level Of Support Discussed With: Patient  Code Status (Patient has no pulse and is not breathing): CPR (Attempt to Resuscitate)  Medical Interventions (Patient has pulse or is breathing): Full Support  Release to patient: Routine Release

## 2023-06-01 LAB
LAB AP CASE REPORT: NORMAL
LAB AP DIAGNOSIS COMMENT: NORMAL
PATH REPORT.FINAL DX SPEC: NORMAL
PATH REPORT.GROSS SPEC: NORMAL

## 2023-06-15 ENCOUNTER — OFFICE VISIT (OUTPATIENT)
Dept: OBSTETRICS AND GYNECOLOGY | Facility: CLINIC | Age: 33
End: 2023-06-15
Payer: COMMERCIAL

## 2023-06-15 VITALS
SYSTOLIC BLOOD PRESSURE: 120 MMHG | BODY MASS INDEX: 30.62 KG/M2 | HEIGHT: 63 IN | DIASTOLIC BLOOD PRESSURE: 78 MMHG | WEIGHT: 172.8 LBS

## 2023-06-15 DIAGNOSIS — Z48.89 POSTOPERATIVE VISIT: ICD-10-CM

## 2023-06-15 DIAGNOSIS — Z13.89 SCREENING FOR GENITOURINARY CONDITION: Primary | ICD-10-CM

## 2023-06-15 PROBLEM — Z30.431 IUD CHECK UP: Status: RESOLVED | Noted: 2022-11-08 | Resolved: 2023-06-15

## 2023-06-15 LAB
BILIRUB BLD-MCNC: NEGATIVE MG/DL
CLARITY, POC: CLEAR
COLOR UR: YELLOW
GLUCOSE UR STRIP-MCNC: NEGATIVE MG/DL
KETONES UR QL: NEGATIVE
LEUKOCYTE EST, POC: NEGATIVE
NITRITE UR-MCNC: NEGATIVE MG/ML
PH UR: 5 [PH] (ref 5–8)
PROT UR STRIP-MCNC: NEGATIVE MG/DL
RBC # UR STRIP: ABNORMAL /UL
SP GR UR: 1 (ref 1–1.03)
UROBILINOGEN UR QL: NORMAL

## 2023-06-15 NOTE — PROGRESS NOTES
S/P uterine ablation and bilateral salpingectomy with me on . Denies F/C or N&V. No abnormal bleeding; some spotting after surgery. Did use Tegaderm on trocar sites.     Last appt: 31 yo  here today for f/u after ED. CT in ED concerning for IUD in incorrect position. Would like bilateral salpingectomy if IUD not correctly placed.         PMH:   Past Medical History:   Diagnosis Date    ADHD     Anxiety     Asthma, exercise induced     inhaler as needed with exercise    Cystic fibrosis carrier     GDM (gestational diabetes mellitus), class A1 2022    GERD (gastroesophageal reflux disease)     Seasonal allergies                 PSH:     Past Surgical History:   Procedure Laterality Date     SECTION N/A 2022    Procedure:  SECTION PRIMARY;  Surgeon: Bouchra Monteiro MD;  Location: [facility] LABOR DELIVERY;  Service: Obstetrics/Gynecology;  Laterality: N/A;    D & C HYSTEROSCOPY ENDOMETRIAL ABLATION N/A 2023    Procedure: Uterine ablation ( Novasure);  Surgeon: Luke Veras DO;  Location: [facility] OR;  Service: Obstetrics/Gynecology;  Laterality: N/A;    ORTHOPEDIC SURGERY      cyst removal on wrist    SALPINGECTOMY Bilateral 2023    Procedure: Bilateral SALPINGECTOMY LAPAROSCOPIC;  Surgeon: Luke Veras DO;  Location: [facility] OR;  Service: Obstetrics/Gynecology;  Laterality: Bilateral;    WISDOM TOOTH EXTRACTION              POB hx: See above   PGYN hx:  STD Denies   Pap NILM    Pap screen  NILM  Co test Neg   Contraception IUD ; string in place    Menarche        Social hx:   Social History     Socioeconomic History    Marital status:      Spouse name: Rickey   Tobacco Use    Smoking status: Never    Smokeless tobacco: Never   Vaping Use    Vaping Use: Never used   Substance and Sexual Activity    Alcohol use: Not Currently     Comment: occ    Drug use: Never    Sexual activity: Yes     Partners: Male     Birth control/protection:  None     Comment: currently pregnant        [  X ] no h/o abuse/DV:         [ X  ] No ETOH, tobacco, drugs  [   ] Tobacco use   [   ] Alcohol use  [   ] Drug use   Marital status:           [  X ] FOB involved     Significant family hx:   Denies                 [ X  ] No birth defects, stillbirth           Allergies:       Allergies   Allergen Reactions    Sulfa Antibiotics Hives and Nausea And Vomiting                  Meds:   Current Outpatient Medications:     ALPRAZolam (XANAX) 0.5 MG tablet, Take 1 tablet by mouth Daily As Needed., Disp: , Rfl:     amphetamine-dextroamphetamine XR (ADDERALL XR) 20 MG 24 hr capsule, Take 1 capsule by mouth Every Morning, Disp: , Rfl:     cetirizine (zyrTEC) 10 MG tablet, Take 1 tablet by mouth Daily., Disp: , Rfl:     docusate sodium (Colace) 100 MG capsule, Take 1 capsule by mouth 2 (Two) Times a Day., Disp: 60 capsule, Rfl: 1    FLUoxetine (PROzac) 20 MG capsule, Take 1 capsule by mouth Daily., Disp: 30 capsule, Rfl: 3    fluticasone (FLONASE) 50 MCG/ACT nasal spray, 1 spray into the nostril(s) as directed by provider Daily., Disp: , Rfl:     ibuprofen (ADVIL,MOTRIN) 800 MG tablet, Take 1 tablet by mouth Every 8 (Eight) Hours As Needed for Moderate Pain., Disp: 30 tablet, Rfl: 0    ipratropium (ATROVENT) 0.06 % nasal spray, 2 sprays into the nostril(s) as directed by provider 4 (Four) Times a Day., Disp: , Rfl:     Misc Natural Products (FIBER SUPREME PO), Take  by mouth., Disp: , Rfl:     multivitamin (MULTI-VITAMIN DAILY PO), Take  by mouth Daily., Disp: , Rfl:     oxyCODONE-acetaminophen (Percocet) 5-325 MG per tablet, Take 1 tablet by mouth Every 4 (Four) Hours As Needed for Severe Pain., Disp: 10 tablet, Rfl: 0     Review of Systems   HVB; spotting     Vitals:    06/15/23 1409   BP: 120/78        OBGyn Exam     Vitals: VSS; AF    General Appearance:  Awake. Alert. Well developed. Well nourished. In no acute distress.    Lungs:  ° Clear to auscultation bilaterally  without wheezes, rales or rhonchi.  Cardiovascular:  ° System: RRR, no murmur  Abdomen:  Visual Inspection: ° Abdomen was normal on visual inspection.  Palpation: ° Abdomen was soft. ° Abdominal non-tender.  Genitalia:  Defer; performed last appt     Neurological:  ° Oriented to time, place, and person.  Skin:  ° General appearance was normal. No bruising or ecchymosis.  Trocar sites x3. Mild erythema RLQ trocar site; no induration or purulence noted       Luke Veras DO    6/15/2023  14:20 EDT            A/P Post op care   Recovering well  F/U in 6 months to assess bleeding s/p ablation or prn    Path report discussed     Luke Veras,     [id number]  [id number]  Answers submitted by the patient for this visit:  Other (Submitted on 6/8/2023)  Please describe your symptoms.: Post-Op follow up  Have you had these symptoms before?: No  How long have you been having these symptoms?: 1-2 weeks  Please describe any probable cause for these symptoms. : Surgery  Primary Reason for Visit (Submitted on 6/8/2023)  What is the primary reason for your visit?: Other

## 2023-11-16 ENCOUNTER — OFFICE VISIT (OUTPATIENT)
Dept: OBSTETRICS AND GYNECOLOGY | Facility: CLINIC | Age: 33
End: 2023-11-16
Payer: COMMERCIAL

## 2023-11-16 VITALS
DIASTOLIC BLOOD PRESSURE: 76 MMHG | SYSTOLIC BLOOD PRESSURE: 118 MMHG | WEIGHT: 176.2 LBS | HEIGHT: 63 IN | BODY MASS INDEX: 31.22 KG/M2

## 2023-11-16 DIAGNOSIS — Z01.419 ROUTINE GYNECOLOGICAL EXAMINATION: Primary | ICD-10-CM

## 2023-11-16 DIAGNOSIS — Z87.42 HISTORY OF HEAVY VAGINAL BLEEDING: ICD-10-CM

## 2023-11-16 DIAGNOSIS — Z11.51 SCREENING FOR HUMAN PAPILLOMAVIRUS (HPV): ICD-10-CM

## 2023-11-16 PROBLEM — O44.41 LOW LYING PLACENTA NOS OR WITHOUT HEMORRHAGE, FIRST TRIMESTER: Status: RESOLVED | Noted: 2022-04-14 | Resolved: 2023-11-16

## 2023-11-16 PROBLEM — O24.410 GDM (GESTATIONAL DIABETES MELLITUS), CLASS A1: Status: RESOLVED | Noted: 2022-05-31 | Resolved: 2023-11-16

## 2023-11-16 PROBLEM — Z48.89 POSTOPERATIVE VISIT: Status: RESOLVED | Noted: 2023-06-15 | Resolved: 2023-11-16

## 2023-11-16 PROBLEM — O35.2XX0 HEREDITARY DISEASE IN FAMILY POSSIBLY AFFECTING FETUS: Status: RESOLVED | Noted: 2022-04-14 | Resolved: 2023-11-16

## 2023-11-16 PROBLEM — Z14.1 CYSTIC FIBROSIS CARRIER: Status: RESOLVED | Noted: 2022-01-19 | Resolved: 2023-11-16

## 2023-11-16 LAB
BILIRUB BLD-MCNC: NEGATIVE MG/DL
CLARITY, POC: CLEAR
COLOR UR: YELLOW
GLUCOSE UR STRIP-MCNC: NEGATIVE MG/DL
KETONES UR QL: NEGATIVE
LEUKOCYTE EST, POC: NEGATIVE
NITRITE UR-MCNC: NEGATIVE MG/ML
PH UR: 5 [PH] (ref 5–8)
PROT UR STRIP-MCNC: NEGATIVE MG/DL
RBC # UR STRIP: NEGATIVE /UL
SP GR UR: 1 (ref 1–1.03)
UROBILINOGEN UR QL: NORMAL

## 2023-11-16 NOTE — PROGRESS NOTES
GYN Annual Exam     CC- Here for annual exam.     Bernardo Tapia is a 32 y.o. female established patient who presents for annual well woman exam. Periods are rare, . With uterine ablation; spotting during her cycles times.     OB History          2    Para   1    Term   1            AB   1    Living   1         SAB   1    IAB        Ectopic        Molar        Multiple   0    Live Births   1                  Current contraception: Bilateral salpingectomy  History of abnormal Pap smear: no  History of abnormal mammogram: no  Family history of uterine, colon or ovarian cancer: no  Family history of breast cancer: yes - Mother Gene test neg per pt   H/o STDs: Denies   Last pap: NILM and co test neg     URSULA: none    Health Maintenance   Topic Date Due    Hepatitis B (1 of 3 - 3-dose series) Never done    BMI FOLLOWUP  Never done    ANNUAL PHYSICAL  Never done    Annual Gynecologic Pelvic and Breast Exam  2022    INFLUENZA VACCINE  2023    COVID-19 Vaccine (3 - - season) 2023    PAP SMEAR  2025    TDAP/TD VACCINES (2 - Td or Tdap) 2032    HEPATITIS C SCREENING  Completed    Pneumococcal Vaccine 0-64  Aged Out       Past Medical History:   Diagnosis Date    ADHD     Anxiety     Asthma, exercise induced     inhaler as needed with exercise    Cystic fibrosis carrier     GDM (gestational diabetes mellitus), class A1 2022    GERD (gastroesophageal reflux disease)     Seasonal allergies        Past Surgical History:   Procedure Laterality Date     SECTION N/A 2022    Procedure:  SECTION PRIMARY;  Surgeon: Bouchra Monteiro MD;  Location: [facility] LABOR DELIVERY;  Service: Obstetrics/Gynecology;  Laterality: N/A;    D & C HYSTEROSCOPY ENDOMETRIAL ABLATION N/A 2023    Procedure: Uterine ablation ( Novasure);  Surgeon: Luke Veras DO;  Location: [facility] OR;  Service: Obstetrics/Gynecology;  Laterality: N/A;    ORTHOPEDIC SURGERY    "   cyst removal on wrist    SALPINGECTOMY Bilateral 5/31/2023    Procedure: Bilateral SALPINGECTOMY LAPAROSCOPIC;  Surgeon: Luke Veras DO;  Location: [facility];  Service: Obstetrics/Gynecology;  Laterality: Bilateral;    WISDOM TOOTH EXTRACTION           Current Outpatient Medications:     ALPRAZolam (XANAX) 0.5 MG tablet, Take 1 tablet by mouth Daily As Needed., Disp: , Rfl:     amphetamine-dextroamphetamine XR (ADDERALL XR) 20 MG 24 hr capsule, Take 1 capsule by mouth Every Morning, Disp: , Rfl:     cetirizine (zyrTEC) 10 MG tablet, Take 1 tablet by mouth Daily., Disp: , Rfl:     FLUoxetine (PROzac) 20 MG capsule, Take 1 capsule by mouth Daily., Disp: 30 capsule, Rfl: 3    fluticasone (FLONASE) 50 MCG/ACT nasal spray, 1 spray into the nostril(s) as directed by provider Daily., Disp: , Rfl:     ipratropium (ATROVENT) 0.06 % nasal spray, 2 sprays into the nostril(s) as directed by provider 4 (Four) Times a Day., Disp: , Rfl:     Misc Natural Products (FIBER SUPREME PO), Take  by mouth., Disp: , Rfl:     multivitamin (MULTI-VITAMIN DAILY PO), Take  by mouth Daily., Disp: , Rfl:     Allergies   Allergen Reactions    Sulfa Antibiotics Hives and Nausea And Vomiting       Social History     Tobacco Use    Smoking status: Never    Smokeless tobacco: Never   Vaping Use    Vaping Use: Never used   Substance Use Topics    Alcohol use: Not Currently     Comment: occ    Drug use: Never       Family History   Problem Relation Age of Onset    Hypertension Mother     Cystic fibrosis Mother     Breast cancer Mother         stage 0 diagnosed >50    No Known Problems Father        Review of Systems      /76   Ht 160 cm (62.99\")   Wt 79.9 kg (176 lb 3.2 oz)   LMP  (Exact Date)   BMI 31.22 kg/m²     Physical Exam  Exam conducted with a chaperone present.   Constitutional:       Appearance: Normal appearance.   Cardiovascular:      Rate and Rhythm: Normal rate and regular rhythm.   Pulmonary:      Effort: Pulmonary " effort is normal.      Breath sounds: Normal breath sounds.   Abdominal:      General: Abdomen is flat.      Palpations: Abdomen is soft.   Genitourinary:     General: Normal vulva.      Exam position: Lithotomy position.      Vagina: Normal.      Cervix: Normal.      Uterus: Normal.       Adnexa: Right adnexa normal and left adnexa normal.   Neurological:      General: No focal deficit present.      Mental Status: She is alert and oriented to person, place, and time.   Psychiatric:         Mood and Affect: Mood normal.         Behavior: Behavior normal.              Assessment/Plan    1) GYN HM: pap/HPV  SBE demonstrated and encouraged.  2) STD screening: declines Condoms encouraged.  3) Contraception: Bilateral salpingectomy  4) Family Planning: family planning: childbearing completed, encourage folic acid daily  5) Diet and Exercise discussed  6) Smoking Status: No  7) Social: IT   8) MMG- plan age 38  9)Follow up prn or 1 year       Diagnoses and all orders for this visit:    1. Routine gynecological examination (Primary)  -     POC Urinalysis Dipstick    2. Screening for human papillomavirus (HPV)  -     POC Urinalysis Dipstick    3. History of heavy vaginal bleeding          Luke Veras DO  11/16/2023    14:46 EST

## 2024-11-25 ENCOUNTER — OFFICE VISIT (OUTPATIENT)
Dept: OBSTETRICS AND GYNECOLOGY | Facility: CLINIC | Age: 34
End: 2024-11-25
Payer: COMMERCIAL

## 2024-11-25 VITALS
DIASTOLIC BLOOD PRESSURE: 84 MMHG | WEIGHT: 169.6 LBS | SYSTOLIC BLOOD PRESSURE: 126 MMHG | BODY MASS INDEX: 28.95 KG/M2 | HEIGHT: 64 IN

## 2024-11-25 DIAGNOSIS — Z11.51 SCREENING FOR HUMAN PAPILLOMAVIRUS (HPV): ICD-10-CM

## 2024-11-25 DIAGNOSIS — Z01.419 CERVICAL SMEAR, AS PART OF ROUTINE GYNECOLOGICAL EXAMINATION: ICD-10-CM

## 2024-11-25 DIAGNOSIS — Z01.419 ROUTINE GYNECOLOGICAL EXAMINATION: Primary | ICD-10-CM

## 2024-11-25 RX ORDER — SPIRONOLACTONE 25 MG/1
25 TABLET ORAL
COMMUNITY
Start: 2024-09-01

## 2024-11-25 NOTE — PROGRESS NOTES
GYN Annual Exam     CC- Here for annual exam.     Bernardo Tapia is a 33 y.o. female established patient who presents for annual well woman exam. Periods are rare s/p uterine ablation; spotting during her cycles times.     Inquires about breast exam today     Recent dental procedure; facial bruising; States no trauma or abuse;  supportive     OB History          2    Para   1    Term   1            AB   1    Living   1         SAB   1    IAB        Ectopic        Molar        Multiple   0    Live Births   1                  Current contraception: Bilateral salpingectomy  History of abnormal Pap smear: no  History of abnormal mammogram: no  Family history of uterine, colon or ovarian cancer: no  Family history of breast cancer: yes - Mother Gene test neg per pt   H/o STDs: Denies   Last pap:  NILM and co test neg     URSULA: none    Health Maintenance   Topic Date Due    BMI FOLLOWUP  Never done    Pneumococcal Vaccine 0-64 (1 of 2 - PCV) Never done    ANNUAL PHYSICAL  Never done    Annual Gynecologic Pelvic and Breast Exam  2022    INFLUENZA VACCINE  2024    COVID-19 Vaccine (3 - - season) 2024    PAP SMEAR  2026    TDAP/TD VACCINES (2 - Td or Tdap) 2032    HEPATITIS C SCREENING  Completed       Past Medical History:   Diagnosis Date    ADHD     Anxiety     Asthma, exercise induced     inhaler as needed with exercise    Cystic fibrosis carrier     GDM (gestational diabetes mellitus), class A1 2022    GERD (gastroesophageal reflux disease)     Seasonal allergies        Past Surgical History:   Procedure Laterality Date     SECTION N/A 2022    Procedure:  SECTION PRIMARY;  Surgeon: Bouchra Monteiro MD;  Location: [facility] LABOR DELIVERY;  Service: Obstetrics/Gynecology;  Laterality: N/A;    D & C HYSTEROSCOPY ENDOMETRIAL ABLATION N/A 2023    Procedure: Uterine ablation ( Novasure);  Surgeon: Luke Veras DO;   "Location: [facility] OR;  Service: Obstetrics/Gynecology;  Laterality: N/A;    ORTHOPEDIC SURGERY      cyst removal on wrist    SALPINGECTOMY Bilateral 5/31/2023    Procedure: Bilateral SALPINGECTOMY LAPAROSCOPIC;  Surgeon: Luke Veras DO;  Location: [facility] OR;  Service: Obstetrics/Gynecology;  Laterality: Bilateral;    WISDOM TOOTH EXTRACTION           Current Outpatient Medications:     ALPRAZolam (XANAX) 0.5 MG tablet, Take 1 tablet by mouth Daily As Needed., Disp: , Rfl:     amphetamine-dextroamphetamine XR (ADDERALL XR) 20 MG 24 hr capsule, Take 1 capsule by mouth Every Morning, Disp: , Rfl:     cetirizine (zyrTEC) 10 MG tablet, Take 1 tablet by mouth Daily., Disp: , Rfl:     FLUoxetine (PROzac) 20 MG capsule, Take 1 capsule by mouth Daily., Disp: 30 capsule, Rfl: 3    fluticasone (FLONASE) 50 MCG/ACT nasal spray, Administer 1 spray into the nostril(s) as directed by provider Daily., Disp: , Rfl:     ipratropium (ATROVENT) 0.06 % nasal spray, Administer 2 sprays into the nostril(s) as directed by provider 4 (Four) Times a Day., Disp: , Rfl:     Misc Natural Products (FIBER SUPREME PO), Take  by mouth., Disp: , Rfl:     multivitamin (MULTI-VITAMIN DAILY PO), Take  by mouth Daily., Disp: , Rfl:     spironolactone (ALDACTONE) 25 MG tablet, 1 tablet., Disp: , Rfl:     Allergies   Allergen Reactions    Sulfa Antibiotics Hives and Nausea And Vomiting       Social History     Tobacco Use    Smoking status: Never    Smokeless tobacco: Never   Vaping Use    Vaping status: Never Used   Substance Use Topics    Alcohol use: Not Currently     Comment: occ    Drug use: Never       Family History   Problem Relation Age of Onset    Hypertension Mother     Cystic fibrosis Mother     Breast cancer Mother         stage 0 diagnosed >50    No Known Problems Father        Review of Systems  Neg     /84   Ht 162.6 cm (64\")   Wt 76.9 kg (169 lb 9.6 oz)   BMI 29.11 kg/m²     Physical Exam  Exam conducted with a chaperone " present.   Constitutional:       Appearance: Normal appearance.   Cardiovascular:      Rate and Rhythm: Normal rate and regular rhythm.   Pulmonary:      Effort: Pulmonary effort is normal.      Breath sounds: Normal breath sounds.   Chest:   Breasts:     Right: Normal. No swelling, bleeding, mass, nipple discharge or skin change.      Left: Normal. No swelling, bleeding, mass, nipple discharge or skin change.   Abdominal:      General: Abdomen is flat.      Palpations: Abdomen is soft.   Genitourinary:     General: Normal vulva.      Exam position: Lithotomy position.      Vagina: Normal.      Cervix: Normal.      Uterus: Normal.       Adnexa: Right adnexa normal and left adnexa normal.   Neurological:      General: No focal deficit present.      Mental Status: She is alert and oriented to person, place, and time.   Psychiatric:         Mood and Affect: Mood normal.         Behavior: Behavior normal.              Assessment/Plan    1) GYN HM: pap/HPV  SBE demonstrated and encouraged.  2) STD screening: declines Condoms encouraged.  3) Contraception: Bilateral salpingectomy  4) Family Planning: family planning: childbearing completed, encourage folic acid daily  5) Diet and Exercise discussed  6) Smoking Status: No  7) Social: IT   8) MMG- plan age 38; Normal breast exam today; Mother breast cancer in her 50's   9)Follow up prn or 1 year       Diagnoses and all orders for this visit:    1. Routine gynecological examination (Primary)  -     POC Urinalysis Dipstick    2. Screening for human papillomavirus (HPV)          Luke Veras DO  [id number]     11:48 EST

## 2024-12-04 LAB
CYTOLOGIST CVX/VAG CYTO: NORMAL
CYTOLOGY CVX/VAG DOC CYTO: NORMAL
CYTOLOGY CVX/VAG DOC THIN PREP: NORMAL
DX ICD CODE: NORMAL
HPV I/H RISK 4 DNA CVX QL PROBE+SIG AMP: NEGATIVE
Lab: NORMAL
OTHER STN SPEC: NORMAL
STAT OF ADQ CVX/VAG CYTO-IMP: NORMAL

## 2025-03-25 ENCOUNTER — TRANSCRIBE ORDERS (OUTPATIENT)
Dept: ADMINISTRATIVE | Facility: HOSPITAL | Age: 35
End: 2025-03-25
Payer: COMMERCIAL

## 2025-03-25 DIAGNOSIS — R10.2 PELVIC CRAMPING: ICD-10-CM

## 2025-03-25 DIAGNOSIS — R58 BLEEDING: Primary | ICD-10-CM

## 2025-04-01 ENCOUNTER — HOSPITAL ENCOUNTER (OUTPATIENT)
Dept: ULTRASOUND IMAGING | Facility: HOSPITAL | Age: 35
Discharge: HOME | End: 2025-04-01
Admitting: INTERNAL MEDICINE
Payer: COMMERCIAL

## 2025-04-01 DIAGNOSIS — R10.2 PELVIC CRAMPING: ICD-10-CM

## 2025-04-01 DIAGNOSIS — R58 BLEEDING: ICD-10-CM

## 2025-04-01 PROCEDURE — 76856 US EXAM PELVIC COMPLETE: CPT

## 2025-04-01 PROCEDURE — 76830 TRANSVAGINAL US NON-OB: CPT

## (undated) DEVICE — SUCTION CANISTER, 1000CC,SAFELINER: Brand: DEROYAL

## (undated) DEVICE — TRY SKINPREP DRYPREP

## (undated) DEVICE — HYDROGEL COATED LATEX URINE METER FOLEY TRAY,16 FR/CH (5.3 MM), 5 ML CATHETER PRE-CONNECTED TO 2000 ML DRAINAGE BAG WITH NEEDLE SAMPLING: Brand: DOVER

## (undated) DEVICE — CONTAINER,SPECIMEN,OR STERILE,4OZ: Brand: MEDLINE

## (undated) DEVICE — GLV SURG NEOLON 2G PF LF 6.5 STRL

## (undated) DEVICE — LAPAROSCOPIC SMOKE FILTRATION SYSTEM: Brand: PALL LAPAROSHIELD® PLUS LAPAROSCOPIC SMOKE FILTRATION SYSTEM

## (undated) DEVICE — TROCAR: Brand: KII SLEEVE

## (undated) DEVICE — LAPAROVUE VISIBILITY SYSTEM LAPAROSCOPIC SOLUTIONS: Brand: LAPAROVUE

## (undated) DEVICE — LAG PERI GYN: Brand: MEDLINE INDUSTRIES, INC.

## (undated) DEVICE — ADHS SKIN PREMIERPRO EXOFIN TOPICAL HI/VISC .5ML

## (undated) DEVICE — NDL HYPO PRECISIONGLIDE REG 25G 1 1/2

## (undated) DEVICE — SUT MNCRYL 4/0 PS2 18 IN

## (undated) DEVICE — ANTIBACTERIAL UNDYED BRAIDED (POLYGLACTIN 910), SYNTHETIC ABSORBABLE SUTURE: Brand: COATED VICRYL

## (undated) DEVICE — SYR LL TP 10ML STRL

## (undated) DEVICE — DRAPE,UNDERBUTTOCKS,STERILE: Brand: MEDLINE

## (undated) DEVICE — TROCAR: Brand: KII FIOS FIRST ENTRY

## (undated) DEVICE — PREP SOL POVIDONE/IODINE BT 4OZ

## (undated) DEVICE — GOWN,PREVENTION PLUS,XLNG/XXLARGE,STRL: Brand: MEDLINE

## (undated) DEVICE — LAG GYN LAPAROSCOPY: Brand: MEDLINE INDUSTRIES, INC.

## (undated) DEVICE — LAPAROSCOPIC TROCAR SLEEVE/SINGLE USE: Brand: KII® OPTICAL ACCESS SYSTEM

## (undated) DEVICE — SOL IRR H2O BTL 1000ML STRL

## (undated) DEVICE — APPL CHLORAPREP W/TINT 26ML ORNG

## (undated) DEVICE — GLV SURG SENSICARE PF POLYISPRN SZ8 LF

## (undated) DEVICE — PROB ABL ENDOMTRL NOVASURE/G4 IMPEDENCE 1P/U

## (undated) DEVICE — CYSTO/BLADDER IRRIGATION SET, REGULATING CLAMP

## (undated) DEVICE — SUT VIC 0 CTX 36IN J978H

## (undated) DEVICE — TBG INSUFL W FLTR STRL

## (undated) DEVICE — SUT GUT CHRM 2/0 CT1 36IN 923H

## (undated) DEVICE — PK C/SECT 40

## (undated) DEVICE — ENSEAL X1 TISSUE SEALER, CURVED JAW, 37 CM SHAFT LENGTH: Brand: ENSEAL